# Patient Record
Sex: MALE | Race: WHITE | NOT HISPANIC OR LATINO | Employment: OTHER | URBAN - NONMETROPOLITAN AREA
[De-identification: names, ages, dates, MRNs, and addresses within clinical notes are randomized per-mention and may not be internally consistent; named-entity substitution may affect disease eponyms.]

---

## 2017-07-25 ENCOUNTER — OFFICE VISIT - GICH (OUTPATIENT)
Dept: FAMILY MEDICINE | Facility: OTHER | Age: 67
End: 2017-07-25

## 2017-07-25 ENCOUNTER — HISTORY (OUTPATIENT)
Dept: FAMILY MEDICINE | Facility: OTHER | Age: 67
End: 2017-07-25

## 2017-07-25 DIAGNOSIS — M25.551 PAIN IN RIGHT HIP: ICD-10-CM

## 2017-12-27 NOTE — PROGRESS NOTES
Patient Information     Patient Name MRN Sex DOB Geisler, Duane M [id number] Male 1950      Progress Notes by Lalo Penaloza MD at 2017  2:30 PM     Author:  Lalo Penaloza MD Service:  (none) Author Type:  Physician     Filed:  2017  2:48 PM Encounter Date:  2017 Status:  Signed     :  Lalo Penaloza MD (Physician)            SUBJECTIVE:  Duane M Geisler is a 66 y.o. male here for right hip pain. Patient was point softball last night when he got hit by a line drive in his right lateral hip. He is playing pitcher. He had immediate pain but was able to walk. Since then he has been using ibuprofen and ice. He has had improvement in his pain.    ROS:    As above otherwise ROS is unremarkable.    OBJECTIVE:  /76  Pulse 60  Ht 1.829 m (6')  Wt 91.1 kg (200 lb 12.8 oz)  BMI 27.23 kg/m2    EXAM:  General Appearance: Pleasant, alert, appropriate appearance for age. No acute distress  Musculoskeletal: Right hip shows flexion of 90 , internal rotation 10 , external rotation 30 . He has tenderness just below his greater trochanter.   Skin: He has a 15 cm x 12 cm bruise that is noted.      ASSESSEMENT AND PLAN:    Duane was seen today for leg pain/problem.    Diagnoses and all orders for this visit:    Right hip pain    Likely bruise. Recommend stretching and icing. If he has pain that is not improving we did discuss the possibility of calcification of his hematoma where ultrasound may be beneficial, he can contact me if that happens.      Chad Penaloza MD    This document was prepared using voice generated software.  While every attempt was made for accuracy, grammatical errors may exist.

## 2017-12-30 NOTE — NURSING NOTE
Patient Information     Patient Name MRN Sex DOB Geisler, Duane M [id number] Male 1950      Nursing Note by Nina Smith at 2017  2:30 PM     Author:  Nina Smith Service:  (none) Author Type:  (none)     Filed:  2017  2:48 PM Encounter Date:  2017 Status:  Signed     :  Nina Smith            Patient presents today with pain below his right hip from being struck by a softball. Patient states he felt increasing pain yesterday, but has improved today.  Nina Smith LPN .............2017  2:28 PM

## 2018-01-27 VITALS
DIASTOLIC BLOOD PRESSURE: 76 MMHG | HEIGHT: 72 IN | BODY MASS INDEX: 27.2 KG/M2 | WEIGHT: 200.8 LBS | HEART RATE: 60 BPM | SYSTOLIC BLOOD PRESSURE: 108 MMHG

## 2019-11-07 ENCOUNTER — OFFICE VISIT (OUTPATIENT)
Dept: PEDIATRICS | Facility: OTHER | Age: 69
End: 2019-11-07
Attending: INTERNAL MEDICINE
Payer: MEDICARE

## 2019-11-07 VITALS
SYSTOLIC BLOOD PRESSURE: 118 MMHG | DIASTOLIC BLOOD PRESSURE: 72 MMHG | TEMPERATURE: 97.1 F | OXYGEN SATURATION: 98 % | HEIGHT: 72 IN | RESPIRATION RATE: 18 BRPM | WEIGHT: 202 LBS | BODY MASS INDEX: 27.36 KG/M2 | HEART RATE: 68 BPM

## 2019-11-07 DIAGNOSIS — E78.2 MIXED HYPERLIPIDEMIA: ICD-10-CM

## 2019-11-07 DIAGNOSIS — Z12.5 SCREENING FOR PROSTATE CANCER: ICD-10-CM

## 2019-11-07 DIAGNOSIS — Z13.0 SCREENING, ANEMIA, DEFICIENCY, IRON: ICD-10-CM

## 2019-11-07 DIAGNOSIS — I49.9 IRREGULAR HEARTBEAT: ICD-10-CM

## 2019-11-07 DIAGNOSIS — Z11.59 ENCOUNTER FOR HEPATITIS C SCREENING TEST FOR LOW RISK PATIENT: ICD-10-CM

## 2019-11-07 DIAGNOSIS — Z13.1 SCREENING FOR DIABETES MELLITUS: ICD-10-CM

## 2019-11-07 DIAGNOSIS — Z13.29 SCREENING FOR THYROID DISORDER: ICD-10-CM

## 2019-11-07 DIAGNOSIS — Z23 NEED FOR VACCINATION: ICD-10-CM

## 2019-11-07 DIAGNOSIS — I48.3 TYPICAL ATRIAL FLUTTER (H): ICD-10-CM

## 2019-11-07 DIAGNOSIS — Z76.89 ENCOUNTER TO ESTABLISH CARE: Primary | ICD-10-CM

## 2019-11-07 LAB
ANION GAP SERPL CALCULATED.3IONS-SCNC: 6 MMOL/L (ref 3–14)
BUN SERPL-MCNC: 19 MG/DL (ref 7–25)
CALCIUM SERPL-MCNC: 9.4 MG/DL (ref 8.6–10.3)
CHLORIDE SERPL-SCNC: 103 MMOL/L (ref 98–107)
CHOLEST SERPL-MCNC: 228 MG/DL
CO2 SERPL-SCNC: 29 MMOL/L (ref 21–31)
CREAT SERPL-MCNC: 1.09 MG/DL (ref 0.7–1.3)
ERYTHROCYTE [DISTWIDTH] IN BLOOD BY AUTOMATED COUNT: 12.9 % (ref 10–15)
GFR SERPL CREATININE-BSD FRML MDRD: 67 ML/MIN/{1.73_M2}
GLUCOSE SERPL-MCNC: 100 MG/DL (ref 70–105)
HCT VFR BLD AUTO: 43.6 % (ref 40–53)
HDLC SERPL-MCNC: 59 MG/DL (ref 23–92)
HGB BLD-MCNC: 14.7 G/DL (ref 13.3–17.7)
LDLC SERPL CALC-MCNC: 146 MG/DL
MCH RBC QN AUTO: 30.6 PG (ref 26.5–33)
MCHC RBC AUTO-ENTMCNC: 33.7 G/DL (ref 31.5–36.5)
MCV RBC AUTO: 91 FL (ref 78–100)
NONHDLC SERPL-MCNC: 169 MG/DL
PLATELET # BLD AUTO: 288 10E9/L (ref 150–450)
POTASSIUM SERPL-SCNC: 4.2 MMOL/L (ref 3.5–5.1)
PSA SERPL-ACNC: 0.38 NG/ML
RBC # BLD AUTO: 4.8 10E12/L (ref 4.4–5.9)
SODIUM SERPL-SCNC: 138 MMOL/L (ref 134–144)
TRIGL SERPL-MCNC: 113 MG/DL
TSH SERPL DL<=0.05 MIU/L-ACNC: 2 IU/ML (ref 0.34–5.6)
WBC # BLD AUTO: 7.2 10E9/L (ref 4–11)

## 2019-11-07 PROCEDURE — 80048 BASIC METABOLIC PNL TOTAL CA: CPT | Mod: ZL | Performed by: INTERNAL MEDICINE

## 2019-11-07 PROCEDURE — G0472 HEP C SCREEN HIGH RISK/OTHER: HCPCS | Performed by: INTERNAL MEDICINE

## 2019-11-07 PROCEDURE — G0463 HOSPITAL OUTPT CLINIC VISIT: HCPCS | Mod: 25

## 2019-11-07 PROCEDURE — 99214 OFFICE O/P EST MOD 30 MIN: CPT | Performed by: INTERNAL MEDICINE

## 2019-11-07 PROCEDURE — G0009 ADMIN PNEUMOCOCCAL VACCINE: HCPCS

## 2019-11-07 PROCEDURE — 93005 ELECTROCARDIOGRAM TRACING: CPT | Performed by: INTERNAL MEDICINE

## 2019-11-07 PROCEDURE — G0103 PSA SCREENING: HCPCS | Mod: ZL | Performed by: INTERNAL MEDICINE

## 2019-11-07 PROCEDURE — 93010 ELECTROCARDIOGRAM REPORT: CPT | Performed by: INTERNAL MEDICINE

## 2019-11-07 PROCEDURE — 90662 IIV NO PRSV INCREASED AG IM: CPT

## 2019-11-07 PROCEDURE — 80061 LIPID PANEL: CPT | Mod: ZL | Performed by: INTERNAL MEDICINE

## 2019-11-07 PROCEDURE — 85027 COMPLETE CBC AUTOMATED: CPT | Mod: ZL | Performed by: INTERNAL MEDICINE

## 2019-11-07 PROCEDURE — 90670 PCV13 VACCINE IM: CPT

## 2019-11-07 PROCEDURE — 36415 COLL VENOUS BLD VENIPUNCTURE: CPT | Mod: ZL | Performed by: INTERNAL MEDICINE

## 2019-11-07 PROCEDURE — 86803 HEPATITIS C AB TEST: CPT | Mod: ZL | Performed by: INTERNAL MEDICINE

## 2019-11-07 PROCEDURE — G0008 ADMIN INFLUENZA VIRUS VAC: HCPCS

## 2019-11-07 PROCEDURE — 84443 ASSAY THYROID STIM HORMONE: CPT | Mod: ZL | Performed by: INTERNAL MEDICINE

## 2019-11-07 RX ORDER — ASPIRIN 81 MG/1
81 TABLET ORAL DAILY
COMMUNITY
Start: 2014-02-06 | End: 2019-11-07

## 2019-11-07 RX ORDER — METOPROLOL TARTRATE 25 MG/1
12.5 TABLET, FILM COATED ORAL 2 TIMES DAILY
Qty: 45 TABLET | Refills: 3 | Status: SHIPPED | OUTPATIENT
Start: 2019-11-07 | End: 2020-05-12

## 2019-11-07 RX ORDER — ASPIRIN 325 MG
325 TABLET, DELAYED RELEASE (ENTERIC COATED) ORAL DAILY
Qty: 90 TABLET | Refills: 3 | Status: SHIPPED | OUTPATIENT
Start: 2019-11-07 | End: 2020-11-23

## 2019-11-07 ASSESSMENT — MIFFLIN-ST. JEOR: SCORE: 1709.27

## 2019-11-07 ASSESSMENT — PAIN SCALES - GENERAL: PAINLEVEL: NO PAIN (0)

## 2019-11-07 NOTE — NURSING NOTE
Immunization Documentation    Prior to Immunization administration, verified patients identity using patient's name and date of birth. Please see IMMUNIZATIONS  and order for additional information.  Patient / Parent instructed to remain in clinic for 15 minutes and report any adverse reaction to staff immediately.    Was entire vial of medication used? Yes  Vial/Syringe: Single dose vial and syringe    Mireya Saucedo LPN  11/7/2019   11:32 AM

## 2019-11-07 NOTE — NURSING NOTE
Patient presents to clinic to establish care today and for a annual review.   Mireya Saucedo LPN ....................  11/7/2019   11:04 AM    No LMP for male patient.  Medication Reconciliation: complete    Mireya Saucedo LPN  11/7/2019 11:04 AM

## 2019-11-07 NOTE — PROGRESS NOTES
Subjective  Duane M Geisler is a 69 year old male who presents for [facility] primary care.  Previous physician was Dr. Agrawal.  He has no major health problems.  He is been taking an aspirin 81 mg a day and wonders if he should continue taking it.  No chest pains.  No heart palpitations.  No exercise intolerance.  No orthopnea.  No lower extremity edema.    Review of Systems:  Skin: Negative  Eyes: Negative  Ears/Nose/Throat: Negative  Respiratory: Negative  Cardiovascular: Negative  Gastrointestinal: Negative  Genitourinary: Negative  Musculoskeletal: Negative  Neurologic: Negative  Psychiatric: Negative  Hematologic/Lymphatic/Immunologic: Negative  Endocrine: Negative        Problem List/PMH: reviewed in EMR, and made relevant updates today.  Medications: reviewed in EMR, and made relevant updates today.  Allergies: reviewed in EMR, and made relevant updates today.    Social Hx:  Social History     Tobacco Use     Smoking status: Never Smoker     Smokeless tobacco: Never Used   Substance Use Topics     Alcohol use: Yes     Alcohol/week: 2.5 standard drinks     Comment: rare     Drug use: Never     Social History     Patient does not qualify to have social determinant information on file (likely too young).   Social History Narrative    , works for [employer] 4 children      Preloaded 3/13/2013.     I reviewed social history and made relevant updates today.    Family Hx:   Family History   Problem Relation Age of Onset     Heart Disease Mother         Heart Disease,h/o valve replacement     Blood Disease Mother         Blood Disease,myelodysplasia     Diabetes Father         Diabetes     Cancer Maternal Grandmother         Cancer, lung cancer     Microcephaly Maternal Grandfather      Myocardial Infarction Maternal Grandfather        Objective  Vitals: reviewed in EMR.  /72 (BP Location: Right arm, Patient Position: Sitting, Cuff Size: Adult Large)   Pulse 68   Temp 97.1  F (36.2   C) (Tympanic)   Resp 18   Ht 1.829 m (6')   Wt 91.6 kg (202 lb)   SpO2 98%   BMI 27.40 kg/m      Gen: Pleasant male, NAD.  HEENT: MMM, no OP erythema.   Neck: Supple, no JVD, no bruits.  CV: Irregularly irregular, no murmurs  Pulm: CTAB no w/r/r  Neuro: Grossly intact  Msk: No lower extremity edema.  Skin: No concerning lesions.  Psychiatric: Normal affect and insight. Does not appear anxious or depressed.    Component      Latest Ref Rng & Units 4/5/2016   Protein Total      6.4 - 8.9 g/dL 6.8   AST (SGOT) - Historical      13 - 39 IU/L 18   Sodium      134 - 143 mmol/L 140   Potassium      3.5 - 5.1 mmol/L 3.9   Calcium      8.6 - 10.3 mg/dL 9.6   Urea Nitrogen      7 - 25 mg/dL 20   Chloride      98 - 107 mmol/L 109 (H)   GFR Estimate If Black      >60 ml/min/1.73m2 >60   BUN/Creatinine Ratio - Historical       18   Albumin      3.5 - 5.7 g/dL 3.8   A/G Ratio - Historical      1.0 - 2.0 1.3   Alkaline Phosphatase      34 - 104 IU/L 64   Anion Gap - Historical      5 - 18 2 (L)   Globulin - Historical      2.0 - 3.7 g/dL 3.0   Bilirubin Total      0.3 - 1.0 mg/dL 0.4   Glucose      70 - 105 mg/dL 100   ALT (SGPT) - Historical      7 - 52 IU/L 17   Carbon Dioxide      21 - 31 mmol/L 29   Creatinine      0.70 - 1.30 mg/dL 1.14   GFR If Not  - Historical      >60 ml/min/1.73m2 >60     Result Information     Status: Final result (Resulted: 11/7/2019) Provider Status: Open   Narrative     EKG Interpretation:   Rhythm: Flutter  Rate: 90  Axis: Normal  Conduction: Normal  QRS: Normal  ST Segments: Normal  T-wave: Normal  Chambers: Normal  PACs Present: No  PVCs Present: No    Impression:   Abnormal EKG.  Atrial flutter.  Compared to February 6, 2014: Atrial flutter is new.    Signed, Bryant Weeks MD, FAAP, FACP  Internal Medicine & Pediatrics  11/7/2019  4:41 PM         Assessment    ICD-10-CM    1. Encounter to establish care Z76.89    2. Mixed hyperlipidemia E78.2 Lipid Profile     Lipid Profile    3. Encounter for hepatitis C screening test for low risk patient Z11.59 Hepatitis C Screen Reflex to HCV RNA Quant and Genotype     Hepatitis C Screen Reflex to HCV RNA Quant and Genotype   4. Need for vaccination Z23    5. Screening for diabetes mellitus Z13.1 Basic metabolic panel     Basic metabolic panel   6. Screening, anemia, deficiency, iron Z13.0 CBC with platelets     CBC with platelets   7. Screening for prostate cancer Z12.5 PSA Screen GH     PSA Screen GH   8. Screening for thyroid disorder Z13.29 Thyrotropin GH     Thyrotropin GH   9. Irregular heartbeat I49.9 EKG 12-lead, tracing only   10. Typical atrial flutter (H) I48.3 aspirin (ASA) 325 MG EC tablet     metoprolol tartrate (LOPRESSOR) 25 MG tablet     Orders Placed This Encounter   Procedures     GH IMM-  PNEUMOCOCCAL CONJ VACCINE 13 VALENT IM     GH IMM-  FLU VACCINE, INCREASED ANTIGEN, PRESV FREE     Hepatitis C Screen Reflex to HCV RNA Quant and Genotype     Basic metabolic panel     CBC with platelets     Lipid Profile     PSA Screen GH     Thyrotropin GH     EKG 12-lead, tracing only       After discovering the irregular heart rate today an EKG was obtained revealing atrial flutter.  We had a lengthy discussion today with regards to atrial fibrillation/flutter today including pathophysiology, expected clinical course, possible complications.  Given his CHADS2-VASc score of 1 (age) a full-strength aspirin is recommended.  With regards to rate versus rhythm control he is asymptomatic and we decided on rate control and low-dose metoprolol was initiated.  Warning signs discussed including fatigue and hypotension.    Plan   -- Expected clinical course discussed   -- Medications and their side effects discussed  Patient Instructions      -- Flu and Prevnar 13 vaccines today   -- Tdap and shingles vaccines at the pharmacy     -- Start aspirin 325 mg daily for stroke prevention in atrial fib with CHADS2-VASc of 1   -- Start metoprolol at a low dose  to keep your resting heart rate under 90 if possible    Patient Education     What Is Atrial Flutter/Atrial Fibrillation?      The heart has its own electrical system. This system makes the signals that start each heartbeat. The heartbeat begins in 1 of the 2 upper chambers of the heart (atria). A problem can make the atria beat faster than normal. The atria may beat fast but still evenly. This problem is called atrial flutter. If the atria beat very fast and also unevenly, it is called atrial fibrillation (AFib).  Causes of Atrial Flutter and Atrial Fibrillation  Causes of these problems can include:    Previous heart attack    High blood pressure    Thyroid problems  In many cases, the cause is unknown.  When the Atria Beat Too Fast  The atria may beat fast only once in a while. This is called a paroxysmal heart rhythm problem. If they beat fast all the time, it is a chronic problem.  Atrial Flutter  With atrial flutter, electrical signals travel around and around inside the atria. These circling signals make the atria beat too fast:    Atrial flutter can cause symptoms similar to AFib. It can also lead to the even faster, uneven rhythms of AFib.  Atrial Fibrillation (AFib)  With AFib, cells in the atria send extra electrical signals. These extra signals make the atria beat very fast. They also beat unevenly:    The atria beat so fast and unevenly that they may quiver instead of william. If the atria don t contract, they don t move enough blood into the 2 lower chambers of the heart (ventricles). This can cause you to feel dizzy or weak.    Blood that doesn t keep moving can pool and form clots in the atria. These clots can move into other parts of the body and cause serious problems such as a stroke.   Symptoms of Atrial Flutter and AFib  These symptoms include the following:    Palpitations (a fluttering, fast heartbeat)    Weakness or tiredness    Shortness of breath    Chest pain or tightness    Dizziness  or lightheadedness    Fainting spells   Date Last Reviewed: 2/26/2014 2000-2018 [employer]. [address] [facility], [place] [address]. All rights reserved. This information is not intended as a substitute for professional medical care. Always follow your healthcare professional's instructions.               Return in about 1 year (around 11/7/2020), or if symptoms worsen or fail to improve, for medication management.    Signed, Bryant Weeks MD, FAAP, FACP  Internal Medicine & Pediatrics

## 2019-11-07 NOTE — LETTER
November 11, 2019      Duane M Geisler  [address]  [place] [address]        Dear ,    We are writing to inform you of your test results.    Your cholesterol score is elevated at 16.8%.  Above 7.5% is recommended to start a statin.  This would be in order to reduce her risk for heart attack and stroke.  Let me know if you are willing to start one, for example atorvastatin 10 mg daily.    Signed, Bryant Weeks MD, FAAP, FACP  Internal Medicine & Pediatrics      The 10-year ASCVD risk score (Alexeyheron HARLEY Jr., et al., 2013) is: 16.8%    Values used to calculate the score:      Age: 69 years      Sex: Male      Is Non- : No      Diabetic: No      Tobacco smoker: No      Systolic Blood Pressure: 118 mmHg      Is BP treated: No      HDL Cholesterol: 59 mg/dL      Total Cholesterol: 228 mg/dL      Resulted Orders   Thyrotropin GH   Result Value Ref Range    Thyrotropin 2.00 0.34 - 5.60 IU/mL   PSA Screen GH   Result Value Ref Range    PSA Screen 0.383 <3.100 ng/mL      Comment:      The DXI Access PSAS WHO assay is a two site immunoenzymatic assay. Assay   values obtained with different assay methods cannot be used interchangeably   due to differences in assay methods and reagent specificity.     Lipid Profile   Result Value Ref Range    Cholesterol 228 (H) <200 mg/dL    Triglycerides 113 <150 mg/dL    HDL Cholesterol 59 23 - 92 mg/dL    LDL Cholesterol Calculated 146 (H) <100 mg/dL      Comment:      Above desirable:  100-129 mg/dl  Borderline High:  130-159 mg/dL  High:             160-189 mg/dL  Very high:       >189 mg/dl      Non HDL Cholesterol 169 (H) <130 mg/dL      Comment:      Above Desirable:  130-159 mg/dl  Borderline high:  160-189 mg/dl  High:             190-219 mg/dl  Very high:       >219 mg/dl     CBC with platelets   Result Value Ref Range    WBC 7.2 4.0 - 11.0 10e9/L    RBC Count 4.80 4.4 - 5.9 10e12/L    Hemoglobin 14.7 13.3 - 17.7 g/dL    Hematocrit 43.6 40.0 -  53.0 %    MCV 91 78 - 100 fl    MCH 30.6 26.5 - 33.0 pg    MCHC 33.7 31.5 - 36.5 g/dL    RDW 12.9 10.0 - 15.0 %    Platelet Count 288 150 - 450 10e9/L   Basic metabolic panel   Result Value Ref Range    Sodium 138 134 - 144 mmol/L    Potassium 4.2 3.5 - 5.1 mmol/L    Chloride 103 98 - 107 mmol/L    Carbon Dioxide 29 21 - 31 mmol/L    Anion Gap 6 3 - 14 mmol/L    Glucose 100 70 - 105 mg/dL    Urea Nitrogen 19 7 - 25 mg/dL    Creatinine 1.09 0.70 - 1.30 mg/dL    GFR Estimate 67 >60 mL/min/[1.73_m2]    GFR Estimate If Black 81 >60 mL/min/[1.73_m2]    Calcium 9.4 8.6 - 10.3 mg/dL   Hepatitis C Screen Reflex to HCV RNA Quant and Genotype   Result Value Ref Range    Hepatitis C Antibody Nonreactive NR^Nonreactive      Comment:      Assay performance characteristics have not been established for newborns,   infants, and children         If you have any questions or concerns, please call the clinic at the number listed above.       Sincerely,        Bryant Weeks MD

## 2019-11-07 NOTE — PATIENT INSTRUCTIONS
-- Flu and Prevnar 13 vaccines today   -- Tdap and shingles vaccines at the pharmacy     -- Start aspirin 325 mg daily for stroke prevention in atrial fib with CHADS2-VASc of 1   -- Start metoprolol at a low dose to keep your resting heart rate under 90 if possible    Patient Education     What Is Atrial Flutter/Atrial Fibrillation?      The heart has its own electrical system. This system makes the signals that start each heartbeat. The heartbeat begins in 1 of the 2 upper chambers of the heart (atria). A problem can make the atria beat faster than normal. The atria may beat fast but still evenly. This problem is called atrial flutter. If the atria beat very fast and also unevenly, it is called atrial fibrillation (AFib).  Causes of Atrial Flutter and Atrial Fibrillation  Causes of these problems can include:    Previous heart attack    High blood pressure    Thyroid problems  In many cases, the cause is unknown.  When the Atria Beat Too Fast  The atria may beat fast only once in a while. This is called a paroxysmal heart rhythm problem. If they beat fast all the time, it is a chronic problem.  Atrial Flutter  With atrial flutter, electrical signals travel around and around inside the atria. These circling signals make the atria beat too fast:    Atrial flutter can cause symptoms similar to AFib. It can also lead to the even faster, uneven rhythms of AFib.  Atrial Fibrillation (AFib)  With AFib, cells in the atria send extra electrical signals. These extra signals make the atria beat very fast. They also beat unevenly:    The atria beat so fast and unevenly that they may quiver instead of william. If the atria don t contract, they don t move enough blood into the 2 lower chambers of the heart (ventricles). This can cause you to feel dizzy or weak.    Blood that doesn t keep moving can pool and form clots in the atria. These clots can move into other parts of the body and cause serious problems such as a  stroke.   Symptoms of Atrial Flutter and AFib  These symptoms include the following:    Palpitations (a fluttering, fast heartbeat)    Weakness or tiredness    Shortness of breath    Chest pain or tightness    Dizziness or lightheadedness    Fainting spells   Date Last Reviewed: 2/26/2014 2000-2018 [employer]. [address] [place] [address]. All rights reserved. This information is not intended as a substitute for professional medical care. Always follow your healthcare professional's instructions.

## 2019-11-10 LAB — HCV AB SERPL QL IA: NONREACTIVE

## 2020-05-12 DIAGNOSIS — I48.3 TYPICAL ATRIAL FLUTTER (H): ICD-10-CM

## 2020-05-12 RX ORDER — METOPROLOL TARTRATE 25 MG/1
TABLET, FILM COATED ORAL
Qty: 45 TABLET | Refills: 3 | Status: SHIPPED | OUTPATIENT
Start: 2020-05-12 | End: 2020-11-18

## 2020-05-12 NOTE — TELEPHONE ENCOUNTER
[facility] sent Rx request for the following:      Metoprolol Tartrate 25 MG Oral Tablet   Sig: Take 1/2 (one-half) tablet by mouth twice daily      Last Prescription Date:   11/7/2019  Last Fill Qty/Refills:         45, R-3    Last Office Visit:              11/7/2019   Future Office visit:           none      Prescription refilled per RN Medication Refill Policy.................... Dequan Mccormick RN ....................  5/12/2020   11:18 AM

## 2020-11-13 DIAGNOSIS — I48.3 TYPICAL ATRIAL FLUTTER (H): ICD-10-CM

## 2020-11-16 RX ORDER — METOPROLOL TARTRATE 25 MG/1
TABLET, FILM COATED ORAL
Qty: 45 TABLET | Refills: 0 | OUTPATIENT
Start: 2020-11-16

## 2020-11-16 NOTE — TELEPHONE ENCOUNTER
metoprolol tartrate (LOPRESSOR) 25 MG tablet 45 tablet 3 5/12/2020  No   Sig: Take 1/2 (one-half) tablet by mouth twice daily     To Estephania Gilbert RN on 11/16/2020 at 10:42 AM

## 2020-11-18 ENCOUNTER — TELEPHONE (OUTPATIENT)
Dept: PEDIATRICS | Facility: OTHER | Age: 70
End: 2020-11-18

## 2020-11-18 DIAGNOSIS — I48.3 TYPICAL ATRIAL FLUTTER (H): ICD-10-CM

## 2020-11-18 RX ORDER — METOPROLOL TARTRATE 25 MG/1
TABLET, FILM COATED ORAL
Qty: 90 TABLET | Refills: 3 | Status: SHIPPED | OUTPATIENT
Start: 2020-11-18 | End: 2021-11-19

## 2020-11-18 NOTE — TELEPHONE ENCOUNTER
Called pharmacy and spoke with Yvonne , pharmacist and she states that Metoprolol was denied because patient should have refills.  Per current med list  metoprolol tartrate (LOPRESSOR) 25 MG tablet 45 tablet 3 5/12/2020  No   Sig: Take 1/2 (one-half) tablet by mouth twice daily     Which is only a 6 month supply because patient is taking 1/2 tablet twice daily.    Will route to Dr. Weeks for review and consideration  Patient is noted to have appointment on 11/23/2020 but patient will be out before appt    Julia Gilbert RN on 11/18/2020 at 9:44 AM

## 2020-11-23 ENCOUNTER — OFFICE VISIT (OUTPATIENT)
Dept: PEDIATRICS | Facility: OTHER | Age: 70
End: 2020-11-23
Attending: INTERNAL MEDICINE
Payer: MEDICARE

## 2020-11-23 VITALS
OXYGEN SATURATION: 99 % | SYSTOLIC BLOOD PRESSURE: 126 MMHG | HEART RATE: 82 BPM | TEMPERATURE: 96.1 F | DIASTOLIC BLOOD PRESSURE: 92 MMHG | WEIGHT: 205.4 LBS | BODY MASS INDEX: 27.82 KG/M2 | HEIGHT: 72 IN | RESPIRATION RATE: 16 BRPM

## 2020-11-23 DIAGNOSIS — Z13.220 LIPID SCREENING: ICD-10-CM

## 2020-11-23 DIAGNOSIS — I48.3 TYPICAL ATRIAL FLUTTER (H): Primary | ICD-10-CM

## 2020-11-23 DIAGNOSIS — Z91.89 10 YEAR RISK OF MI OR STROKE 7.5% OR GREATER: ICD-10-CM

## 2020-11-23 DIAGNOSIS — R04.0 EPISTAXIS: ICD-10-CM

## 2020-11-23 DIAGNOSIS — Z13.1 SCREENING FOR DIABETES MELLITUS: ICD-10-CM

## 2020-11-23 DIAGNOSIS — E78.2 MIXED HYPERLIPIDEMIA: ICD-10-CM

## 2020-11-23 LAB
ANION GAP SERPL CALCULATED.3IONS-SCNC: 5 MMOL/L (ref 3–14)
BUN SERPL-MCNC: 16 MG/DL (ref 7–25)
CALCIUM SERPL-MCNC: 9.5 MG/DL (ref 8.6–10.3)
CHLORIDE SERPL-SCNC: 106 MMOL/L (ref 98–107)
CHOLEST SERPL-MCNC: 211 MG/DL
CO2 SERPL-SCNC: 29 MMOL/L (ref 21–31)
CREAT SERPL-MCNC: 1.09 MG/DL (ref 0.7–1.3)
ERYTHROCYTE [DISTWIDTH] IN BLOOD BY AUTOMATED COUNT: 13 % (ref 10–15)
GFR SERPL CREATININE-BSD FRML MDRD: 67 ML/MIN/{1.73_M2}
GLUCOSE SERPL-MCNC: 95 MG/DL (ref 70–105)
HCT VFR BLD AUTO: 42.5 % (ref 40–53)
HDLC SERPL-MCNC: 55 MG/DL (ref 23–92)
HGB BLD-MCNC: 14.2 G/DL (ref 13.3–17.7)
LDLC SERPL CALC-MCNC: 127 MG/DL
MCH RBC QN AUTO: 30.7 PG (ref 26.5–33)
MCHC RBC AUTO-ENTMCNC: 33.4 G/DL (ref 31.5–36.5)
MCV RBC AUTO: 92 FL (ref 78–100)
NONHDLC SERPL-MCNC: 156 MG/DL
PLATELET # BLD AUTO: 296 10E9/L (ref 150–450)
POTASSIUM SERPL-SCNC: 3.9 MMOL/L (ref 3.5–5.1)
RBC # BLD AUTO: 4.62 10E12/L (ref 4.4–5.9)
SODIUM SERPL-SCNC: 140 MMOL/L (ref 134–144)
TRIGL SERPL-MCNC: 147 MG/DL
WBC # BLD AUTO: 7.6 10E9/L (ref 4–11)

## 2020-11-23 PROCEDURE — 80061 LIPID PANEL: CPT | Mod: ZL | Performed by: INTERNAL MEDICINE

## 2020-11-23 PROCEDURE — G0463 HOSPITAL OUTPT CLINIC VISIT: HCPCS | Mod: 25

## 2020-11-23 PROCEDURE — 80048 BASIC METABOLIC PNL TOTAL CA: CPT | Mod: ZL | Performed by: INTERNAL MEDICINE

## 2020-11-23 PROCEDURE — 85027 COMPLETE CBC AUTOMATED: CPT | Mod: ZL | Performed by: INTERNAL MEDICINE

## 2020-11-23 PROCEDURE — 99214 OFFICE O/P EST MOD 30 MIN: CPT | Performed by: INTERNAL MEDICINE

## 2020-11-23 PROCEDURE — 36415 COLL VENOUS BLD VENIPUNCTURE: CPT | Mod: ZL | Performed by: INTERNAL MEDICINE

## 2020-11-23 PROCEDURE — G0463 HOSPITAL OUTPT CLINIC VISIT: HCPCS

## 2020-11-23 PROCEDURE — 90732 PPSV23 VACC 2 YRS+ SUBQ/IM: CPT

## 2020-11-23 RX ORDER — ATORVASTATIN CALCIUM 10 MG/1
10 TABLET, FILM COATED ORAL DAILY
Qty: 90 TABLET | Refills: 3 | Status: SHIPPED | OUTPATIENT
Start: 2020-11-23 | End: 2022-01-04

## 2020-11-23 RX ORDER — ASPIRIN 81 MG/1
81 TABLET, CHEWABLE ORAL DAILY
COMMUNITY
Start: 2020-11-23 | End: 2024-02-15

## 2020-11-23 ASSESSMENT — MIFFLIN-ST. JEOR: SCORE: 1719.69

## 2020-11-23 ASSESSMENT — PAIN SCALES - GENERAL: PAINLEVEL: NO PAIN (0)

## 2020-11-23 NOTE — NURSING NOTE
Chief Complaint   Patient presents with     Recheck Medication     Patient is here for a recheck on medications    Initial BP (!) 140/94 (BP Location: Right arm, Patient Position: Sitting, Cuff Size: Adult Regular)   Pulse 82   Temp 96.1  F (35.6  C) (Tympanic)   Resp 16   Ht 1.829 m (6')   Wt 93.2 kg (205 lb 6.4 oz)   SpO2 99%   BMI 27.86 kg/m   Estimated body mass index is 27.86 kg/m  as calculated from the following:    Height as of this encounter: 1.829 m (6').    Weight as of this encounter: 93.2 kg (205 lb 6.4 oz).  Medication Reconciliation: complete    Josselyn Montilla MA     Immunization Documentation    Prior to Immunization administration, verified patients identity using patient's name and date of birth. Please see IMMUNIZATIONS  and order for additional information.  Patient / Parent instructed to remain in clinic for 15 minutes and report any adverse reaction to staff immediately.    Was the entire amount of vaccines given used? Yes    Josselyn Montilla MA  11/23/2020   10:59 AM

## 2020-11-23 NOTE — PATIENT INSTRUCTIONS
-- Continue aspirin 81 mg   -- Humidifier in bedroom   -- Consider ENT consult if nosebleeds     -- Research and consider starting atorvastatin     -- Avoid constipation   -- Low salt   -- Low caffeine/alcohol   -- No fluids after supper     -- Get Tdap at pharmacy   -- Get the new shingles vaccine series from a nurse-only visit, pharmacy or [facility] (Counts include 234 beds at the [facility] RN).   -- Pneumonia 23 today    The 10-year ASCVD risk score (Alexey HARLEY Jr., et al., 2013) is: 19.9%    Values used to calculate the score:      Age: 70 years      Sex: Male      Is Non- : No      Diabetic: No      Tobacco smoker: No      Systolic Blood Pressure: 126 mmHg      Is BP treated: No      HDL Cholesterol: 59 mg/dL      Total Cholesterol: 228 mg/dL

## 2020-11-23 NOTE — PROGRESS NOTES
"Subjective  Duane M Geisler is a 70 year old male who presents for medication management.  He wants to know why he is on \"that little white pill.\"  He has a history of atrial flutter and continues on metoprolol for rate control.  He had increased his aspirin from 81 up to 325 however he developed nosebleed so he reduced it back to 81 and has had no recurrence of nosebleeds.  He did not start the statin as we recommended last year.  He is apprehensive about starting new medications.  No chest pains with exertion.  Rare heart palpitations.  He has been going to the bathroom at night 1-4 times which is stable for years.  He gets a slight swelling on his ankle in the evening better by morning.    Problem List/PMH: reviewed in EMR, and made relevant updates today.  Medications: reviewed in EMR, and made relevant updates today.  Allergies: reviewed in EMR, and made relevant updates today.    Social Hx:  Social History     Tobacco Use     Smoking status: Never Smoker     Smokeless tobacco: Never Used   Substance Use Topics     Alcohol use: Yes     Alcohol/week: 2.5 standard drinks     Comment: rare     Drug use: Never     Social History     Social History Narrative    , works for [employer]      I reviewed social history and made relevant updates today.    Family Hx:   Family History   Problem Relation Age of Onset     Heart Disease Mother         Heart Disease,h/o valve replacement     Blood Disease Mother         Blood Disease,myelodysplasia     Diabetes Father         Diabetes     Cancer Maternal Grandmother         Cancer, lung cancer     Microcephaly Maternal Grandfather      Myocardial Infarction Maternal Grandfather        Objective  Vitals: reviewed in EMR.  BP (!) 126/92 (BP Location: Right arm, Patient Position: Sitting, Cuff Size: Adult Regular)   Pulse 82   Temp 96.1  F (35.6  C) (Tympanic)   Resp 16   Ht 1.829 m (6')   Wt 93.2 kg (205 lb 6.4 oz)   SpO2 99%   BMI " 27.86 kg/m      Gen: Pleasant male, NAD.  HEENT: MMM, no OP erythema.   Neck: Supple  CV: Irregularly irregular no m/r/g.   Pulm: CTAB no w/r/r  GI: Soft, NT, ND. No HSM. No masses. Bowel sounds present.  Neuro: Grossly intact  Msk: No lower extremity edema.  Skin: No concerning lesions.  Psychiatric: Normal affect and insight. Does not appear anxious or depressed.    Labs:  Lab Results   Component Value Date    WBC 7.6 11/23/2020    HGB 14.2 11/23/2020    HCT 42.5 11/23/2020     11/23/2020    CHOL 211 (H) 11/23/2020    TRIG 147 11/23/2020    HDL 55 11/23/2020     11/23/2020    BUN 16 11/23/2020    CO2 29 11/23/2020       Glucose   Date Value Ref Range Status   11/23/2020 95 70 - 105 mg/dL Final   11/07/2019 100 70 - 105 mg/dL Final     No results found for: A1C  Creatinine   Date Value Ref Range Status   11/23/2020 1.09 0.70 - 1.30 mg/dL Final   11/07/2019 1.09 0.70 - 1.30 mg/dL Final     LDL Cholesterol Calculated   Date Value Ref Range Status   11/23/2020 127 (H) <100 mg/dL Final     Comment:     Above desirable:  100-129 mg/dl  Borderline High:  130-159 mg/dL  High:             160-189 mg/dL  Very high:       >189 mg/dl       Thyrotropin   Date Value Ref Range Status   11/07/2019 2.00 0.34 - 5.60 IU/mL Final               Assessment    ICD-10-CM    1. Typical atrial flutter (H)  I48.3 aspirin (ASA) 81 MG chewable tablet   2. Lipid screening  Z13.220 Lipid Profile     Lipid Profile   3. Screening for diabetes mellitus  Z13.1 Basic metabolic panel     Basic metabolic panel   4. Mixed hyperlipidemia  E78.2 atorvastatin (LIPITOR) 10 MG tablet   5. 10 year risk of MI or stroke 7.5% or greater  Z91.89 atorvastatin (LIPITOR) 10 MG tablet   6. Epistaxis  R04.0 CBC with platelets     CBC with platelets     Orders Placed This Encounter   Procedures     Pneumococcal vaccine 23 valent PPSV23  (Pneumovax) [03560]     Lipid Profile     Basic metabolic panel     CBC with platelets       With regards to atrial  fibrillation/flutter his [id number] score is 1 for age and anticoagulation is not indicated at this time.  Based on his episodes of epistaxis I agree with ongoing aspirin 81 mg.  We discussed the possibility of ENT consultation and decided against it at this time.    Plan   -- Expected clinical course discussed   -- Medications and their side effects discussed  Patient Instructions    -- Continue aspirin 81 mg   -- Humidifier in bedroom   -- Consider ENT consult if nosebleeds     -- Research and consider starting atorvastatin     -- Avoid constipation   -- Low salt   -- Low caffeine/alcohol   -- No fluids after supper     -- Get Tdap at pharmacy   -- Get the new shingles vaccine series from a nurse-only visit, pharmacy or [facility] ([facility] RN).   -- Pneumonia 23 today    The 10-year ASCVD risk score (Idalou CHERRI Jr., et al., 2013) is: 19.9%    Values used to calculate the score:      Age: 70 years      Sex: Male      Is Non- : No      Diabetic: No      Tobacco smoker: No      Systolic Blood Pressure: 126 mmHg      Is BP treated: No      HDL Cholesterol: 59 mg/dL      Total Cholesterol: 228 mg/dL        Return in about 1 year (around 11/23/2021), or if symptoms worsen or fail to improve.    Signed, Bryant Weeks MD, FAAP, FACP  Internal Medicine & Pediatrics

## 2020-11-23 NOTE — LETTER
November 23, 2020      Duane M Geisler  [address]  [place] [address]        Dear ,    We are writing to inform you of your test results.    Labs look fine. No change to our plan.    Signed, Bryant Weeks MD, FAAP, FACP  Internal Medicine & Pediatrics      The 10-year ASCVD risk score (Alexey HARLEY Jr., et al., 2013) is: 19.8%    Values used to calculate the score:      Age: 70 years      Sex: Male      Is Non- : No      Diabetic: No      Tobacco smoker: No      Systolic Blood Pressure: 126 mmHg      Is BP treated: No      HDL Cholesterol: 55 mg/dL      Total Cholesterol: 211 mg/dL      Resulted Orders   CBC with platelets   Result Value Ref Range    WBC 7.6 4.0 - 11.0 10e9/L    RBC Count 4.62 4.4 - 5.9 10e12/L    Hemoglobin 14.2 13.3 - 17.7 g/dL    Hematocrit 42.5 40.0 - 53.0 %    MCV 92 78 - 100 fl    MCH 30.7 26.5 - 33.0 pg    MCHC 33.4 31.5 - 36.5 g/dL    RDW 13.0 10.0 - 15.0 %    Platelet Count 296 150 - 450 10e9/L   Basic metabolic panel   Result Value Ref Range    Sodium 140 134 - 144 mmol/L    Potassium 3.9 3.5 - 5.1 mmol/L    Chloride 106 98 - 107 mmol/L    Carbon Dioxide 29 21 - 31 mmol/L    Anion Gap 5 3 - 14 mmol/L    Glucose 95 70 - 105 mg/dL    Urea Nitrogen 16 7 - 25 mg/dL    Creatinine 1.09 0.70 - 1.30 mg/dL    GFR Estimate 67 >60 mL/min/[1.73_m2]    GFR Estimate If Black 80 >60 mL/min/[1.73_m2]    Calcium 9.5 8.6 - 10.3 mg/dL   Lipid Profile   Result Value Ref Range    Cholesterol 211 (H) <200 mg/dL    Triglycerides 147 <150 mg/dL    HDL Cholesterol 55 23 - 92 mg/dL    LDL Cholesterol Calculated 127 (H) <100 mg/dL      Comment:      Above desirable:  100-129 mg/dl  Borderline High:  130-159 mg/dL  High:             160-189 mg/dL  Very high:       >189 mg/dl      Non HDL Cholesterol 156 (H) <130 mg/dL      Comment:      Above Desirable:  130-159 mg/dl  Borderline high:  160-189 mg/dl  High:             190-219 mg/dl  Very high:       >219 mg/dl         If you  have any questions or concerns, please call the clinic at the number listed above.       Sincerely,        Bryant Weeks MD

## 2020-12-14 ENCOUNTER — HOSPITAL ENCOUNTER (OUTPATIENT)
Dept: GENERAL RADIOLOGY | Facility: OTHER | Age: 70
End: 2020-12-14
Attending: INTERNAL MEDICINE
Payer: MEDICARE

## 2020-12-14 ENCOUNTER — OFFICE VISIT (OUTPATIENT)
Dept: PEDIATRICS | Facility: OTHER | Age: 70
End: 2020-12-14
Attending: INTERNAL MEDICINE
Payer: MEDICARE

## 2020-12-14 VITALS
BODY MASS INDEX: 28.2 KG/M2 | HEART RATE: 90 BPM | RESPIRATION RATE: 16 BRPM | TEMPERATURE: 96 F | DIASTOLIC BLOOD PRESSURE: 98 MMHG | SYSTOLIC BLOOD PRESSURE: 134 MMHG | WEIGHT: 207.9 LBS | OXYGEN SATURATION: 91 %

## 2020-12-14 DIAGNOSIS — I48.91 ATRIAL FIBRILLATION/FLUTTER (H): ICD-10-CM

## 2020-12-14 DIAGNOSIS — I48.92 ATRIAL FIBRILLATION/FLUTTER (H): ICD-10-CM
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
## 2020-12-14 DIAGNOSIS — S22.31XA CLOSED FRACTURE OF ONE RIB OF RIGHT SIDE, INITIAL ENCOUNTER: Primary | ICD-10-CM

## 2020-12-14 PROBLEM — I48.3 TYPICAL ATRIAL FLUTTER (H): Status: RESOLVED | Noted: 2019-11-07 | Resolved: 2020-12-14

## 2020-12-14 PROCEDURE — 99213 OFFICE O/P EST LOW 20 MIN: CPT | Performed by: INTERNAL MEDICINE

## 2020-12-14 PROCEDURE — 71101 X-RAY EXAM UNILAT RIBS/CHEST: CPT | Mod: RT

## 2020-12-14 PROCEDURE — G0463 HOSPITAL OUTPT CLINIC VISIT: HCPCS

## 2020-12-14 PROCEDURE — G0463 HOSPITAL OUTPT CLINIC VISIT: HCPCS | Mod: 25

## 2020-12-14 RX ORDER — HYDROCODONE BITARTRATE AND ACETAMINOPHEN 5; 325 MG/1; MG/1
1 TABLET ORAL
Qty: 7 TABLET | Refills: 0 | Status: SHIPPED | OUTPATIENT
Start: 2020-12-14 | End: 2020-12-21

## 2020-12-14 ASSESSMENT — PAIN SCALES - GENERAL: PAINLEVEL: EXTREME PAIN (9)

## 2020-12-14 NOTE — PATIENT INSTRUCTIONS
-- Voltaren gel   -- Norco at bedtime if needed   -- Ibuprofen throughout the day if needed   -- Rest   -- Deep breathing   -- Return if worse    Patient Education     Rib Fracture    You broke one or more ribs. This is called a rib fracture. Rib fractures don't need a cast like other bones. They will heal by themselves in about 4 to 6 weeks. The first 3 to 4 weeks will be the most painful. During this time deep breathing, coughing, or changing position from sitting to lying down, may cause the broken ends to move slightly.   Home care    Rest. You should not be doing any heavy lifting or strenuous exertion until the pain goes away.    It hurts to breathe when you have a broken rib. This puts you at risk of getting pneumonia from poor airflow through your lungs. To prevent this:  ? Take several very deep breaths once an hour while you're awake. Breathe out through pursed lips as if you are blowing up a balloon. If possible, actually blow up a balloon or a rubber glove. This exercise builds up pressure inside the lung and prevents collapse of the small air sacs of the lung. This exercise may cause some pain at the site of injury. This is normal.  ? You may have gotten a breathing exercise device called an incentive spirometer. Use it at least 4 times a day, or as directed.    Apply an ice pack over the injured area for 15 to 20 minutes every 1 to 2 hours. You should do this for the first 24 to 48 hours. To make an ice pack, put ice cubes in a plastic bag that seals at the top. Wrap the bag in a clean, thin towel or cloth. Never put ice or an ice pack directly on the skin. Keep using ice packs as needed for the relief of pain and swelling.    You may use over-the-counter pain medicine to control pain, unless another pain medicine was prescribed. If you have chronic liver or kidney disease or ever had a stomach ulcer or GI (gastrointestinal) bleeding, talk with your healthcare provider before using these  medicines.    If your pain is not controlled, contact your healthcare provider. Sometimes a stronger pain medicine may be needed. A nerve block can be done in case of severe pain. It will numb the nerve between the ribs.    Follow-up care  Follow up with your healthcare provider, or as advised. In rare cases, a broken rib will cause complications in the first few days that may not be clearly seen during your initial exam. This can include collapsed lung, bleeding around the lung or into the belly (abdomen), or pneumonia. So watch for the signs below.   If X-rays were taken, you will be told of any new findings that may affect your care.  Call 911  Call 911 if you have:     Dizziness, weakness or fainting    Shortness of breath with or without chest discomfort    New or worsening abdominal pain    Discomfort in other areas of your upper body such as your shoulders, jaw, neck, or arms  When to seek medical advice  Call your healthcare provider right away if any of these occur:    Increasing chest pain with breathing    Fever of 100.4 F (38 C) or above, or as directed by your healthcare provider    Congested cough, nausea, or vomiting  Louisa last reviewed this educational content on 4/1/2018 2000-2020 [employer]. [address] [place] [address], [place] [address]. All rights reserved. This information is not intended as a substitute for professional medical care. Always follow your healthcare professional's instructions.

## 2020-12-14 NOTE — PROGRESS NOTES
Subjective  Duane M Geisler is a 70 year old male who presents for fall on ice.  Yesterday afternoon at the end of his chores he decided to walk out on the ice to see how thick it was.  There was a small dusting of snow on the ice.  He initially took 1 step onto the ice and instantly flew backward landing onto his back quite hard.  He had pain that started immediately.  It is hard for him to sleep.  There is no radiation of the pain.  He did not hit his head nor did he pass out.  It is only better if he sits in a soft chair resting.  No cough or shortness of breath.    Problem List/PMH: reviewed in EMR, and made relevant updates today.  Medications: reviewed in EMR, and made relevant updates today.  Allergies: reviewed in EMR, and made relevant updates today.    Social Hx:  Social History     Tobacco Use     Smoking status: Never Smoker     Smokeless tobacco: Never Used   Substance Use Topics     Alcohol use: Yes     Alcohol/week: 2.5 standard drinks     Comment: rare     Drug use: Never     Social History     Social History Narrative    , works for "[email or web address]      I reviewed social history and made relevant updates today.    Family Hx:   Family History   Problem Relation Age of Onset     Heart Disease Mother         Heart Disease,h/o valve replacement     Blood Disease Mother         Blood Disease,myelodysplasia     Diabetes Father         Diabetes     Cancer Maternal Grandmother         Cancer, lung cancer     Microcephaly Maternal Grandfather      Myocardial Infarction Maternal Grandfather        Objective  Vitals: reviewed in EMR.  BP (!) 134/98 (BP Location: Right arm, Patient Position: Sitting, Cuff Size: Adult Regular)   Pulse 90   Temp 96  F (35.6  C) (Tympanic)   Resp 16   Wt 94.3 kg (207 lb 14.4 oz)   SpO2 91%   BMI 28.20 kg/m      Gen: Pleasant male, NAD.  HEENT: MMM  Neck: Supple  Cardiovascular: Irregularly irregular  Pulm: Clear to auscultation bilaterally,  breathing easily  Chest: No posterior midline tenderness to palpation.  No pain elicited with lateral compression of rib cage.  No crepitus.  Neuro: Grossly intact  Skin: No concerning lesions.  Psychiatric: Normal affect and insight. Does not appear anxious or depressed.    X-ray of chest and ribs  Obtained today  A rib fracture is seen    Assessment    ICD-10-CM    1. Closed fracture of one rib of right side, initial encounter  S22.31XA HYDROcodone-acetaminophen (NORCO) 5-325 MG tablet   2. Fall due to slipping on ice or snow, initial encounter  W00.9XXA XR Ribs & Chest Rt 3vw     HYDROcodone-acetaminophen (NORCO) 5-325 MG tablet   3. Atrial fibrillation/flutter (H)  I48.91     I48.92     CHADS2-VASc score of 1 (age) on aspirin       Orders Placed This Encounter   Procedures     XR Ribs & Chest Rt 3vw       We had a lengthy discussion today with regards to rib fractures.  I have recommended deep breathing frequently to avoid splinting and pneumonia.  Some narcotic pain medication was prescribed and recommend least amount that is effective.  Warning signs discussed and prompt repeat evaluation recommended if symptoms persist or worsen.    Plan   -- Expected clinical course discussed   -- Medications and their side effects discussed  Patient Instructions    -- Voltaren gel   -- Norco at bedtime if needed   -- Ibuprofen throughout the day if needed   -- Rest   -- Deep breathing   -- Return if worse    Patient Education     Rib Fracture    You broke one or more ribs. This is called a rib fracture. Rib fractures don't need a cast like other bones. They will heal by themselves in about 4 to 6 weeks. The first 3 to 4 weeks will be the most painful. During this time deep breathing, coughing, or changing position from sitting to lying down, may cause the broken ends to move slightly.   Home care    Rest. You should not be doing any heavy lifting or strenuous exertion until the pain goes away.    It hurts to breathe when  you have a broken rib. This puts you at risk of getting pneumonia from poor airflow through your lungs. To prevent this:  ? Take several very deep breaths once an hour while you're awake. Breathe out through pursed lips as if you are blowing up a balloon. If possible, actually blow up a balloon or a rubber glove. This exercise builds up pressure inside the lung and prevents collapse of the small air sacs of the lung. This exercise may cause some pain at the site of injury. This is normal.  ? You may have gotten a breathing exercise device called an incentive spirometer. Use it at least 4 times a day, or as directed.    Apply an ice pack over the injured area for 15 to 20 minutes every 1 to 2 hours. You should do this for the first 24 to 48 hours. To make an ice pack, put ice cubes in a plastic bag that seals at the top. Wrap the bag in a clean, thin towel or cloth. Never put ice or an ice pack directly on the skin. Keep using ice packs as needed for the relief of pain and swelling.    You may use over-the-counter pain medicine to control pain, unless another pain medicine was prescribed. If you have chronic liver or kidney disease or ever had a stomach ulcer or GI (gastrointestinal) bleeding, talk with your healthcare provider before using these medicines.    If your pain is not controlled, contact your healthcare provider. Sometimes a stronger pain medicine may be needed. A nerve block can be done in case of severe pain. It will numb the nerve between the ribs.    Follow-up care  Follow up with your healthcare provider, or as advised. In rare cases, a broken rib will cause complications in the first few days that may not be clearly seen during your initial exam. This can include collapsed lung, bleeding around the lung or into the belly (abdomen), or pneumonia. So watch for the signs below.   If X-rays were taken, you will be told of any new findings that may affect your care.  Call 911  Call 911 if you have:      Dizziness, weakness or fainting    Shortness of breath with or without chest discomfort    New or worsening abdominal pain    Discomfort in other areas of your upper body such as your shoulders, jaw, neck, or arms  When to seek medical advice  Call your healthcare provider right away if any of these occur:    Increasing chest pain with breathing    Fever of 100.4 F (38 C) or above, or as directed by your healthcare provider    Congested cough, nausea, or vomiting  [employer] last reviewed this educational content on 4/1/2018 2000-2020 [employer]. [address] [place] [address]. All rights reserved. This information is not intended as a substitute for professional medical care. Always follow your healthcare professional's instructions.               Return if symptoms worsen or fail to improve.    Bryant Campos MD, FAAP, FACP  Internal Medicine & Pediatrics

## 2020-12-14 NOTE — NURSING NOTE
Chief Complaint   Patient presents with     Fall     Back Pain     Mid and low back      Patient is here for a fall that he had yesterday afternoon. He slipped on ice and landed on his back. He is having mid and low back pain.     Initial BP (!) 132/98 (BP Location: Right arm, Patient Position: Sitting, Cuff Size: Adult Regular)   Pulse 90   Temp 96  F (35.6  C) (Tympanic)   Resp 16   Wt 94.3 kg (207 lb 14.4 oz)   SpO2 91%   BMI 28.20 kg/m   Estimated body mass index is 28.2 kg/m  as calculated from the following:    Height as of 11/23/20: 1.829 m (6').    Weight as of this encounter: 94.3 kg (207 lb 14.4 oz).  Medication Reconciliation: complete    Josselyn Montilla MA

## 2020-12-17 ENCOUNTER — TELEPHONE (OUTPATIENT)
Dept: PEDIATRICS | Facility: OTHER | Age: 70
End: 2020-12-17

## 2020-12-17 NOTE — TELEPHONE ENCOUNTER
I thought I sent the report to him.  He broke 3 ribs.  No change to our plan.    Signed, Bryant Weeks MD, FAAP, FACP  Internal Medicine & Pediatrics

## 2020-12-17 NOTE — TELEPHONE ENCOUNTER
Patient informed he broke  3 ribs and no change to the plan Dr. Weeks and him discussed.     Josselyn Montilla CMA on 12/17/2020 at 1:34 PM

## 2020-12-17 NOTE — TELEPHONE ENCOUNTER
Patient has not gotten the topical gel pain reliever.   And has not heard from anyone what the radiologist has said.    Please call patient back.   Thank You Christina Bui on 12/17/2020 at 10:09 AM

## 2020-12-17 NOTE — TELEPHONE ENCOUNTER
Patient was informed Voltaren gel can be bought over-the-counter.     He is wondering if xray results are back. Please review results and make any recommendations necessary.     Josselyn Montilla CMA on 12/17/2020 at 12:04 PM

## 2020-12-27 ENCOUNTER — HEALTH MAINTENANCE LETTER (OUTPATIENT)
Age: 70
End: 2020-12-27

## 2021-04-28 ENCOUNTER — MYC MEDICAL ADVICE (OUTPATIENT)
Dept: PEDIATRICS | Facility: OTHER | Age: 71
End: 2021-04-28

## 2021-04-28 DIAGNOSIS — Z12.5 SCREENING FOR PROSTATE CANCER: Primary | ICD-10-CM

## 2021-05-04 DIAGNOSIS — Z12.5 SCREENING FOR PROSTATE CANCER: ICD-10-CM

## 2021-05-04 LAB — PSA SERPL-ACNC: 0.43 NG/ML

## 2021-05-04 PROCEDURE — 36415 COLL VENOUS BLD VENIPUNCTURE: CPT | Mod: ZL | Performed by: INTERNAL MEDICINE

## 2021-05-04 PROCEDURE — G0103 PSA SCREENING: HCPCS | Mod: ZL | Performed by: INTERNAL MEDICINE

## 2021-09-03 ENCOUNTER — ALLIED HEALTH/NURSE VISIT (OUTPATIENT)
Dept: FAMILY MEDICINE | Facility: OTHER | Age: 71
End: 2021-09-03
Attending: NURSE PRACTITIONER
Payer: MEDICARE

## 2021-09-03 DIAGNOSIS — Z20.822 EXPOSURE TO 2019 NOVEL CORONAVIRUS: Primary | ICD-10-CM

## 2021-09-03 DIAGNOSIS — Z20.822 ENCOUNTER FOR LABORATORY TESTING FOR COVID-19 VIRUS: ICD-10-CM

## 2021-09-03 PROCEDURE — C9803 HOPD COVID-19 SPEC COLLECT: HCPCS

## 2021-09-03 PROCEDURE — U0003 INFECTIOUS AGENT DETECTION BY NUCLEIC ACID (DNA OR RNA); SEVERE ACUTE RESPIRATORY SYNDROME CORONAVIRUS 2 (SARS-COV-2) (CORONAVIRUS DISEASE [COVID-19]), AMPLIFIED PROBE TECHNIQUE, MAKING USE OF HIGH THROUGHPUT TECHNOLOGIES AS DESCRIBED BY CMS-2020-01-R: HCPCS | Mod: ZL

## 2021-09-03 NOTE — NURSING NOTE
Chief Complaint   Patient presents with     Covid 19 Testing     exposure, runny nose       Patient swabbed for COVID-19 testing.  Farzad Gaviria LPN on 9/3/2021 at 5:34 PM

## 2021-09-05 ENCOUNTER — TELEPHONE (OUTPATIENT)
Dept: FAMILY MEDICINE | Facility: OTHER | Age: 71
End: 2021-09-05

## 2021-09-05 LAB — SARS-COV-2 RNA RESP QL NAA+PROBE: POSITIVE

## 2021-09-06 NOTE — TELEPHONE ENCOUNTER
Coronavirus (COVID-19) Notification    Reason for call  Notify of POSITIVE  COVID-19 lab result, assess symptoms,  review [facility] recommendations    Lab Result   Lab test for 2019-nCoV rRt-PCR or SARS-COV-2 PCR  Oropharyngeal AND/OR nasopharyngeal swabs were POSITIVE for 2019-nCoV RNA [OR] SARS-COV-2 RNA (COVID-19) RNA     We have been unable to reach Patient by phone at this time to notify of their Positive COVID-19 result.  Left voicemail message requesting a call back to [phone] [facility] for results.        POSITIVE COVID-19 Letter sent.    Debo Lizarraga LPN

## 2021-10-01 PROBLEM — I48.91 UNSPECIFIED ATRIAL FIBRILLATION (H): Status: ACTIVE | Noted: 2020-12-14

## 2021-10-09 ENCOUNTER — HEALTH MAINTENANCE LETTER (OUTPATIENT)
Age: 71
End: 2021-10-09

## 2021-11-19 DIAGNOSIS — I48.3 TYPICAL ATRIAL FLUTTER (H): ICD-10-CM

## 2021-11-19 RX ORDER — METOPROLOL TARTRATE 25 MG/1
TABLET, FILM COATED ORAL
Qty: 90 TABLET | Refills: 0 | Status: SHIPPED | OUTPATIENT
Start: 2021-11-19 | End: 2021-11-23

## 2022-01-04 ENCOUNTER — OFFICE VISIT (OUTPATIENT)
Dept: PEDIATRICS | Facility: OTHER | Age: 72
End: 2022-01-04
Attending: INTERNAL MEDICINE
Payer: COMMERCIAL

## 2022-01-04 VITALS
DIASTOLIC BLOOD PRESSURE: 82 MMHG | SYSTOLIC BLOOD PRESSURE: 130 MMHG | WEIGHT: 202 LBS | HEIGHT: 72 IN | OXYGEN SATURATION: 97 % | RESPIRATION RATE: 16 BRPM | TEMPERATURE: 97.6 F | HEART RATE: 86 BPM | BODY MASS INDEX: 27.36 KG/M2

## 2022-01-04 DIAGNOSIS — Z23 NEED FOR VACCINATION: ICD-10-CM

## 2022-01-04 DIAGNOSIS — E78.2 MIXED HYPERLIPIDEMIA: ICD-10-CM

## 2022-01-04 DIAGNOSIS — Z13.6 ENCOUNTER FOR ABDOMINAL AORTIC ANEURYSM (AAA) SCREENING: ICD-10-CM

## 2022-01-04 DIAGNOSIS — Z91.89 10 YEAR RISK OF MI OR STROKE 7.5% OR GREATER: ICD-10-CM

## 2022-01-04 DIAGNOSIS — I48.3 TYPICAL ATRIAL FLUTTER (H): ICD-10-CM

## 2022-01-04 DIAGNOSIS — I48.91 ATRIAL FIBRILLATION, UNSPECIFIED TYPE (H): ICD-10-CM

## 2022-01-04 DIAGNOSIS — Z00.00 ENCOUNTER FOR MEDICARE ANNUAL WELLNESS EXAM: Primary | ICD-10-CM

## 2022-01-04 LAB
ANION GAP SERPL CALCULATED.3IONS-SCNC: 8 MMOL/L (ref 3–14)
BUN SERPL-MCNC: 18 MG/DL (ref 7–25)
CALCIUM SERPL-MCNC: 9.8 MG/DL (ref 8.6–10.3)
CHLORIDE BLD-SCNC: 106 MMOL/L (ref 98–107)
CHOLEST SERPL-MCNC: 226 MG/DL
CO2 SERPL-SCNC: 25 MMOL/L (ref 21–31)
CREAT SERPL-MCNC: 1.08 MG/DL (ref 0.7–1.3)
ERYTHROCYTE [DISTWIDTH] IN BLOOD BY AUTOMATED COUNT: 13.2 % (ref 10–15)
FASTING STATUS PATIENT QL REPORTED: NO
GFR SERPL CREATININE-BSD FRML MDRD: 72 ML/MIN/1.73M2
GLUCOSE BLD-MCNC: 100 MG/DL (ref 70–105)
HCT VFR BLD AUTO: 40.9 % (ref 40–53)
HDLC SERPL-MCNC: 60 MG/DL (ref 23–92)
HGB BLD-MCNC: 14.3 G/DL (ref 13.3–17.7)
LDLC SERPL CALC-MCNC: 140 MG/DL
MCH RBC QN AUTO: 30.6 PG (ref 26.5–33)
MCHC RBC AUTO-ENTMCNC: 35 G/DL (ref 31.5–36.5)
MCV RBC AUTO: 87 FL (ref 78–100)
NONHDLC SERPL-MCNC: 166 MG/DL
PLATELET # BLD AUTO: 280 10E3/UL (ref 150–450)
POTASSIUM BLD-SCNC: 4.3 MMOL/L (ref 3.5–5.1)
RBC # BLD AUTO: 4.68 10E6/UL (ref 4.4–5.9)
SODIUM SERPL-SCNC: 139 MMOL/L (ref 134–144)
TRIGL SERPL-MCNC: 131 MG/DL
WBC # BLD AUTO: 7.7 10E3/UL (ref 4–11)

## 2022-01-04 PROCEDURE — 36415 COLL VENOUS BLD VENIPUNCTURE: CPT | Mod: ZL | Performed by: INTERNAL MEDICINE

## 2022-01-04 PROCEDURE — 85027 COMPLETE CBC AUTOMATED: CPT | Mod: ZL | Performed by: INTERNAL MEDICINE

## 2022-01-04 PROCEDURE — G0008 ADMIN INFLUENZA VIRUS VAC: HCPCS

## 2022-01-04 PROCEDURE — 80048 BASIC METABOLIC PNL TOTAL CA: CPT | Mod: ZL | Performed by: INTERNAL MEDICINE

## 2022-01-04 PROCEDURE — G0439 PPPS, SUBSEQ VISIT: HCPCS | Performed by: INTERNAL MEDICINE

## 2022-01-04 PROCEDURE — 80061 LIPID PANEL: CPT | Mod: ZL | Performed by: INTERNAL MEDICINE

## 2022-01-04 RX ORDER — ZOSTER VACCINE RECOMBINANT, ADJUVANTED 50 MCG/0.5
1 KIT INTRAMUSCULAR ONCE
Qty: 0.5 ML | Refills: 1 | Status: SHIPPED | OUTPATIENT
Start: 2022-01-04 | End: 2022-01-04

## 2022-01-04 RX ORDER — METOPROLOL TARTRATE 25 MG/1
25 TABLET, FILM COATED ORAL 2 TIMES DAILY
Qty: 180 TABLET | Refills: 3 | Status: SHIPPED | OUTPATIENT
Start: 2022-01-04 | End: 2023-01-30

## 2022-01-04 ASSESSMENT — MIFFLIN-ST. JEOR: SCORE: 1699.27

## 2022-01-04 ASSESSMENT — PAIN SCALES - GENERAL: PAINLEVEL: NO PAIN (0)

## 2022-01-04 ASSESSMENT — ACTIVITIES OF DAILY LIVING (ADL): CURRENT_FUNCTION: NO ASSISTANCE NEEDED

## 2022-01-04 NOTE — PROGRESS NOTES
SUBJECTIVE:   Duane M Geisler is a 71 year old male who presents for Preventive Visit.      Patient has been advised of split billing requirements and indicates understanding: Yes   Are you in the first 12 months of your Medicare coverage?  No    HPI  Do you feel safe in your environment? Yes    Have you ever done Advance Care Planning? (For example, a Health Directive, POLST, or a discussion with a medical provider or your loved ones about your wishes): No, advance care planning information given to patient to review.  Patient plans to discuss their wishes with loved ones or provider.        Right Ear:      1000 Hz RESPONSE- on Level:   20 db  (Conditioning sound)   1000 Hz: RESPONSE- on Level:   20 db    2000 Hz: RESPONSE- on Level:   20 db    4000 Hz: RESPONSE- on Level: 40 db    Left Ear:      4000 Hz: RESPONSE- on Level: 40 db   2000 Hz: RESPONSE- on Level:   20 db    1000 Hz: RESPONSE- on Level:   20 db     500 Hz: RESPONSE- on Level:   20 db     Right Ear:    500 Hz: RESPONSE- on Level:   20 db     Hearing Acuity: Pass    Hearing Assessment: normal   Fall risk  Fallen 2 or more times in the past year?: No  Any fall with injury in the past year?: No  click delete button to remove this line now  Cognitive Screening   1) Repeat 3 items (Leader, Season, Table)    2) Clock draw: NORMAL  3) 3 item recall: Recalls 1 object   Results: NORMAL clock, 1-2 items recalled: COGNITIVE IMPAIRMENT LESS LIKELY    Mini-CogTM Copyright MAYRA Goode. Licensed by the author for use in [facility]; reprinted with permission (tea@.[email or web address]). All rights reserved.      Do you have sleep apnea, excessive snoring or daytime drowsiness?: yes    Reviewed and updated as needed this visit by clinical staff  Tobacco  Allergies  Meds  Problems  Med Hx  Surg Hx  Fam Hx  Soc Hx         Reviewed and updated as needed this visit by Provider               Social History     Tobacco Use     Smoking status: Never Smoker     Smokeless  tobacco: Never Used   Substance Use Topics     Alcohol use: Not Currently     Alcohol/week: 2.5 standard drinks     Comment: rare     If you drink alcohol do you typically have >3 drinks per day or >7 drinks per week? No    Alcohol Use 1/4/2022   Prescreen: >3 drinks/day or >7 drinks/week? No             Current providers sharing in care for this patient include:   Patient Care Team:  Bryant Weeks MD as PCP - General (Pediatrics)  Bryant Weeks MD as Assigned PCP   [facility]- Dentist      The following health maintenance items are reviewed in Epic and correct as of today:  Health Maintenance Due   Topic Date Due     ADVANCE CARE PLANNING  Never done     DTAP/TDAP/TD IMMUNIZATION (1 - Tdap) Never done     ZOSTER IMMUNIZATION (1 of 2) Never done     MEDICARE ANNUAL WELLNESS VISIT  Never done     AORTIC ANEURYSM SCREENING (SYSTEM ASSIGNED)  Never done     INFLUENZA VACCINE (1) 09/01/2021     Labs reviewed in EPIC  Immunizations reviewed        Review of Systems  Constitutional, HEENT, cardiovascular, pulmonary, gi and gu systems are negative, except as otherwise noted.    OBJECTIVE:   /82   Pulse 86   Temp 97.6  F (36.4  C) (Tympanic)   Resp 16   Ht 1.829 m (6')   Wt 91.6 kg (202 lb)   SpO2 97%   BMI 27.40 kg/m   Estimated body mass index is 27.4 kg/m  as calculated from the following:    Height as of this encounter: 1.829 m (6').    Weight as of this encounter: 91.6 kg (202 lb).  Physical Exam  Gen: Pleasant male, NAD.  HEENT: MMM, no OP erythema.   Neck: Supple, no JVD, no bruits.  CV: RRR no m/r/g.   Pulm: CTAB no w/r/r  Neuro: Grossly intact  Msk: No lower extremity edema.  Skin: No concerning lesions.  Psychiatric: Normal affect and insight. Does not appear anxious or depressed.      Diagnostic Test Results:  Labs reviewed in Epic    ASSESSMENT / PLAN:       ICD-10-CM    1. Encounter for Medicare annual wellness exam  Z00.00    2. 10 year risk of MI or stroke 7.5% or  greater  Z91.89 Lipid Profile     Lipid Profile   3. Mixed hyperlipidemia  E78.2 Lipid Profile     Basic metabolic panel     Lipid Profile     Basic metabolic panel   4. Atrial fibrillation, unspecified type (H)  I48.91 CBC with platelets     CBC with platelets   5. Encounter for abdominal aortic aneurysm (AAA) screening  Z13.6 US Aorta Medicare AAA Screening   6. Need for vaccination  Z23 INFLUENZA, QUAD, HIGH DOSE, PF, 65YR + (FLUZONE HD)     zoster vaccine recombinant adjuvanted (SHINGRIX) injection     Tdap, tetanus-diptheria-acell pertussis, (BOOSTRIX) 5-2.5-18.5 LF-MCG/0.5 SUSP injection   7. Typical atrial flutter (H)  I48.3 metoprolol tartrate (LOPRESSOR) 25 MG tablet         Patient has been advised of split billing requirements and indicates understanding:   COUNSELING:  Reviewed preventive health counseling, as reflected in patient instructions    Estimated body mass index is 27.4 kg/m  as calculated from the following:    Height as of this encounter: 1.829 m (6').    Weight as of this encounter: 91.6 kg (202 lb).    Weight management plan: Discussed healthy diet and exercise guidelines    He reports that he has never smoked. He has never used smokeless tobacco.      Appropriate preventive services were discussed with this patient, including applicable screening as appropriate for cardiovascular disease, diabetes, osteopenia/osteoporosis, and glaucoma.  As appropriate for age/gender, discussed screening for colorectal cancer, prostate cancer, breast cancer, and cervical cancer. Checklist reviewing preventive services available has been given to the patient.    Reviewed patients plan of care and provided an AVS. The Basic Care Plan (routine screening as documented in Health Maintenance) for Duane meets the Care Plan requirement. This Care Plan has been established and reviewed with the Patient.    Counseling Resources:  ATP IV Guidelines  Pooled Cohorts Equation Calculator  Breast Cancer Risk  Calculator  Breast Cancer: Medication to Reduce Risk  FRAX Risk Assessment  ICSI Preventive Guidelines  Dietary Guidelines for Americans, 2010  [employer]'s MyPlate  ASA Prophylaxis  Lung CA Screening    Bryant Weeks MD  [facility]    Identified Health Risks:

## 2022-01-04 NOTE — NURSING NOTE
Chief Complaint   Patient presents with     Medicare Visit         Initial /82   Pulse 86   Temp 97.6  F (36.4  C) (Tympanic)   Resp 16   Ht 1.829 m (6')   Wt 91.6 kg (202 lb)   SpO2 97%   BMI 27.40 kg/m   Estimated body mass index is 27.4 kg/m  as calculated from the following:    Height as of this encounter: 1.829 m (6').    Weight as of this encounter: 91.6 kg (202 lb).       Medication Reconciliation: Complete      FOOD SECURITY SCREENING QUESTIONS:    The next two questions are to help us understand your food security.  If you are feeling you need any assistance in this area, we have resources available to support you today.    Hunger Vital Signs:  Have you worried about running out of food and not having money to buy more?Never        Norma J. Gosselin, LPN

## 2022-01-04 NOTE — PATIENT INSTRUCTIONS
Patient Education   Personalized Prevention Plan  You are due for the preventive services outlined below.  Your care team is available to assist you in scheduling these services.  If you have already completed any of these items, please share that information with your care team to update in your medical record.  Health Maintenance Due   Topic Date Due     Diptheria Tetanus Pertussis (DTAP/TDAP/TD) Vaccine (1 - Tdap) Never done     Zoster (Shingles) Vaccine (1 of 2) Never done     AORTIC ANEURYSM SCREENING (SYSTEM ASSIGNED)  Never done     Flu Vaccine (1) 09/01/2021           The 10-year ASCVD risk score (Alexey HARLEY Jr., et al., 2013) is: 22.2%    Values used to calculate the score:      Age: 71 years      Sex: Male      Is Non- : No      Diabetic: No      Tobacco smoker: No      Systolic Blood Pressure: 130 mmHg      Is BP treated: No      HDL Cholesterol: 55 mg/dL      Total Cholesterol: 211 mg/dL     -- Reconsider a statin   -- Continue aspirin 81   -- Increase metoprolol to a full tablet twice daily for ease of use, should be tolerated   -- Monitor pulse and blood pressure

## 2022-01-06 ENCOUNTER — HOSPITAL ENCOUNTER (OUTPATIENT)
Dept: ULTRASOUND IMAGING | Facility: OTHER | Age: 72
Discharge: HOME | End: 2022-01-06
Attending: INTERNAL MEDICINE | Admitting: INTERNAL MEDICINE
Payer: MEDICARE

## 2022-01-06 DIAGNOSIS — Z13.6 ENCOUNTER FOR ABDOMINAL AORTIC ANEURYSM (AAA) SCREENING: ICD-10-CM

## 2022-01-06 PROCEDURE — 76706 US ABDL AORTA SCREEN AAA: CPT

## 2022-01-28 ENCOUNTER — TELEPHONE (OUTPATIENT)
Dept: PEDIATRICS | Facility: OTHER | Age: 72
End: 2022-01-28
Payer: COMMERCIAL

## 2022-01-28 DIAGNOSIS — I48.3 TYPICAL ATRIAL FLUTTER (H): ICD-10-CM

## 2022-01-28 RX ORDER — METOPROLOL TARTRATE 25 MG/1
TABLET, FILM COATED ORAL
Qty: 90 TABLET | Refills: 0 | OUTPATIENT
Start: 2022-01-28

## 2022-01-28 NOTE — TELEPHONE ENCOUNTER
Left message for patient to contact his pharmacy for his prescription that will be filled.    [facility] confirmed they have the newest prescription and patient called in for a refill off of the old bottle.      Lo Beach LPN 1/28/2022 12:06 PM

## 2022-01-28 NOTE — TELEPHONE ENCOUNTER
[id number] sent Rx request for the following:   Requested Prescriptions   Pending Prescriptions Disp Refills     metoprolol tartrate (LOPRESSOR) 25 MG tablet [Pharmacy Med Name: Metoprolol Tartrate 25 MG Oral Tablet] 90 tablet 0     Sig: Take 1/2 (one-half) tablet by mouth twice daily       Beta-Blockers Protocol Passed - 1/28/2022  5:30 AM      Last Prescription Date:   11/23/2021-1/4/22  Last Fill Qty/Refills:         90, R-3    Last Office Visit:              1/4/2022 Weeks   Future Office visit:           None noted   Medication is being denied due to: Adjustment in therapy  Per note 1/4/2022  (Increase metoprolol to a full tablet twice daily for ease of use should be tolerated )  Melonie Mccabe RN ....................  1/28/2022   8:25 AM

## 2022-01-28 NOTE — TELEPHONE ENCOUNTER
Please call the patient.  An RX  Did not get to the pharmacy.  He is unsure of the name of it.   Pharmacy is [facility].      Rajwinder García on 1/28/2022 at 9:46 AM

## 2022-06-21 ENCOUNTER — OFFICE VISIT (OUTPATIENT)
Dept: INTERNAL MEDICINE | Facility: OTHER | Age: 72
End: 2022-06-21
Attending: INTERNAL MEDICINE
Payer: COMMERCIAL

## 2022-06-21 VITALS
WEIGHT: 199 LBS | HEART RATE: 90 BPM | SYSTOLIC BLOOD PRESSURE: 98 MMHG | RESPIRATION RATE: 20 BRPM | TEMPERATURE: 98.7 F | DIASTOLIC BLOOD PRESSURE: 64 MMHG | BODY MASS INDEX: 26.99 KG/M2 | OXYGEN SATURATION: 97 %

## 2022-06-21 DIAGNOSIS — R21 RASH: Primary | ICD-10-CM

## 2022-06-21 PROCEDURE — 99213 OFFICE O/P EST LOW 20 MIN: CPT | Performed by: INTERNAL MEDICINE

## 2022-06-21 PROCEDURE — G0463 HOSPITAL OUTPT CLINIC VISIT: HCPCS

## 2022-06-21 ASSESSMENT — ENCOUNTER SYMPTOMS
CONSTITUTIONAL NEGATIVE: 1
ALLERGIC/IMMUNOLOGIC NEGATIVE: 1
ENDOCRINE NEGATIVE: 1
EYES NEGATIVE: 1

## 2022-06-21 ASSESSMENT — PAIN SCALES - GENERAL: PAINLEVEL: NO PAIN (0)

## 2022-06-21 NOTE — NURSING NOTE
Patient here for itching rash on the palms of the hands. Then it switches to his feet, arms and legs. Medication Reconciliation: complete.    Juliet Virk LPN  6/21/2022 3:16 PM

## 2022-06-21 NOTE — PROGRESS NOTES
Chief Complaint:  Rash.    HPI: He is complaining of a rash.  This has been something that has been bothering him intermittently for the last 6 months or so.  He will get a red patchy area that is somewhat irritating and uncomfortable and it will last for a few days and then go away.  He tells me that he has an over-the-counter cream that he puts on them and he also uses a Benadryl spray.  No new medications that he is aware of although his wife does give him various vitamins.  He is otherwise quite healthy.  He does not really have a rash to see today.    Past Medical History:   Diagnosis Date     Other specified postprocedural states     12/2002,normal f/u 10 years     Personal history of other medical treatment (CODE)     5/2003, egd for food obstruction     Tubulo-interstitial nephritis     1985       Past Surgical History:   Procedure Laterality Date     COLONOSCOPY      12/2002, normal f/u 10 years     COLONOSCOPY  03/05/2014    3/2014,Moderate diverticulosis - follow up 10 years     ESOPHAGOSCOPY, GASTROSCOPY, DUODENOSCOPY (EGD), COMBINED      5/2003, food obstruction       No Known Allergies    Current Outpatient Medications   Medication Sig Dispense Refill     aspirin (ASA) 81 MG chewable tablet Take 1 tablet (81 mg) by mouth daily       metoprolol tartrate (LOPRESSOR) 25 MG tablet Take 1 tablet (25 mg) by mouth 2 times daily 180 tablet 3       Review of Systems   Constitutional: Negative.    Eyes: Negative.    Endocrine: Negative.    Allergic/Immunologic: Negative.        Physical Exam  Vitals and nursing note reviewed.   Constitutional:       General: He is not in acute distress.     Appearance: Normal appearance. He is not ill-appearing, toxic-appearing or diaphoretic.   Skin:     General: Skin is warm and dry.      Comments: No rash present today.   Neurological:      Mental Status: He is alert.         Assessment:        ICD-10-CM    1. Rash  R21        Plan: It sounds as though he has some sort of an  allergic rash occurring although without being able to see a representative sample, makes it very difficult to say with certainty.  I recommended loratadine 10 mg daily and oral diphenhydramine on a as needed basis.  I did instruct him that if he does get a significant rash that he should take a picture of this and bring it in with him at his next visit.  He will also check with his wife to make sure that he is not getting any sort of new vitamins, etc.    Answers for HPI/ROS submitted by the patient on 6/21/2022  How many servings of fruits and vegetables do you eat daily?: 2-3  On average, how many sweetened beverages do you drink each day (Examples: soda, juice, sweet tea, etc.  Do NOT count diet or artificially sweetened beverages)?: 3  How many minutes a day do you exercise enough to make your heart beat faster?: 9 or less  How many days a week do you exercise enough to make your heart beat faster?: 3 or less  How many days per week do you miss taking your medication?: 0  What is the reason for your visit today?: Rash  When did your symptoms begin?: 3-4 weeks ago  How would you describe these symptoms?: Mild  Are your symptoms:: Worsening  Have you had these symptoms before?: No  Is there anything that makes you feel worse?: Just mostly in the evenings  Is there anything that makes you feel better?: Putting Benadryl on the area of skin that there pain.

## 2022-09-27 ENCOUNTER — TELEPHONE (OUTPATIENT)
Dept: PEDIATRICS | Facility: OTHER | Age: 72
End: 2022-09-27

## 2022-09-27 DIAGNOSIS — R21 RASH: Primary | ICD-10-CM

## 2022-09-27 NOTE — TELEPHONE ENCOUNTER
Reason for call: Request for a referral.    Referral requested for what concern? Rash    Have you already been seen by the specialty you need the referral for?  NO    If yes, Date:   ,  Location:   ,  Provider:       If no,  Where do you want to go?   [facility]    Additional comments:   Ongoing since summer and not getting better.    Preferred method for responding to this message: Telephone Call    Phone number patient can be reached at? Home number on file [phone] (home)    If we can't reach you directly, may we leave a detailed response at the number you provided? Yes

## 2022-09-27 NOTE — TELEPHONE ENCOUNTER
After confirming last name and birthdate. Informed of referral sent to [facility]. Dayna Eisenberg on 9/27/2022 at 3:41 PM

## 2023-01-30 ENCOUNTER — OFFICE VISIT (OUTPATIENT)
Dept: PEDIATRICS | Facility: OTHER | Age: 73
End: 2023-01-30
Attending: INTERNAL MEDICINE
Payer: COMMERCIAL

## 2023-01-30 VITALS
HEIGHT: 72 IN | DIASTOLIC BLOOD PRESSURE: 70 MMHG | WEIGHT: 206.5 LBS | BODY MASS INDEX: 27.97 KG/M2 | HEART RATE: 90 BPM | SYSTOLIC BLOOD PRESSURE: 128 MMHG | RESPIRATION RATE: 20 BRPM | TEMPERATURE: 97.5 F | OXYGEN SATURATION: 100 %

## 2023-01-30 DIAGNOSIS — R39.198 URINARY STREAM SLOWING: ICD-10-CM

## 2023-01-30 DIAGNOSIS — I48.3 TYPICAL ATRIAL FLUTTER (H): ICD-10-CM

## 2023-01-30 DIAGNOSIS — I48.21 PERMANENT ATRIAL FIBRILLATION (H): ICD-10-CM

## 2023-01-30 DIAGNOSIS — L50.8 CHRONIC URTICARIA: ICD-10-CM

## 2023-01-30 DIAGNOSIS — Z13.1 SCREENING FOR DIABETES MELLITUS: ICD-10-CM

## 2023-01-30 DIAGNOSIS — Z00.00 ENCOUNTER FOR MEDICARE ANNUAL WELLNESS EXAM: Primary | ICD-10-CM

## 2023-01-30 DIAGNOSIS — Z79.82 LONG TERM (CURRENT) USE OF ASPIRIN: ICD-10-CM

## 2023-01-30 DIAGNOSIS — E78.2 MIXED HYPERLIPIDEMIA: ICD-10-CM

## 2023-01-30 DIAGNOSIS — R35.1 NOCTURIA: ICD-10-CM

## 2023-01-30 LAB
ANION GAP SERPL CALCULATED.3IONS-SCNC: 6 MMOL/L (ref 7–15)
BUN SERPL-MCNC: 16.4 MG/DL (ref 8–23)
CALCIUM SERPL-MCNC: 9.6 MG/DL (ref 8.8–10.2)
CHLORIDE SERPL-SCNC: 106 MMOL/L (ref 98–107)
CHOLEST SERPL-MCNC: 224 MG/DL
CREAT SERPL-MCNC: 1.05 MG/DL (ref 0.67–1.17)
DEPRECATED HCO3 PLAS-SCNC: 29 MMOL/L (ref 22–29)
ERYTHROCYTE [DISTWIDTH] IN BLOOD BY AUTOMATED COUNT: 13.5 % (ref 10–15)
GFR SERPL CREATININE-BSD FRML MDRD: 74 ML/MIN/1.73M2
GLUCOSE SERPL-MCNC: 93 MG/DL (ref 70–99)
HCT VFR BLD AUTO: 41.7 % (ref 40–53)
HDLC SERPL-MCNC: 53 MG/DL
HGB BLD-MCNC: 14.1 G/DL (ref 13.3–17.7)
HOLD SPECIMEN: NORMAL
LDLC SERPL CALC-MCNC: 123 MG/DL
MCH RBC QN AUTO: 30.4 PG (ref 26.5–33)
MCHC RBC AUTO-ENTMCNC: 33.8 G/DL (ref 31.5–36.5)
MCV RBC AUTO: 90 FL (ref 78–100)
NONHDLC SERPL-MCNC: 171 MG/DL
PLATELET # BLD AUTO: 303 10E3/UL (ref 150–450)
POTASSIUM SERPL-SCNC: 4.4 MMOL/L (ref 3.4–5.3)
PSA SERPL-MCNC: 0.5 NG/ML (ref 0–6.5)
RBC # BLD AUTO: 4.64 10E6/UL (ref 4.4–5.9)
SODIUM SERPL-SCNC: 141 MMOL/L (ref 136–145)
TRIGL SERPL-MCNC: 238 MG/DL
WBC # BLD AUTO: 9.1 10E3/UL (ref 4–11)

## 2023-01-30 PROCEDURE — 80061 LIPID PANEL: CPT | Mod: ZL | Performed by: INTERNAL MEDICINE

## 2023-01-30 PROCEDURE — 36415 COLL VENOUS BLD VENIPUNCTURE: CPT | Mod: ZL | Performed by: INTERNAL MEDICINE

## 2023-01-30 PROCEDURE — 84153 ASSAY OF PSA TOTAL: CPT | Mod: ZL | Performed by: INTERNAL MEDICINE

## 2023-01-30 PROCEDURE — 80048 BASIC METABOLIC PNL TOTAL CA: CPT | Mod: ZL | Performed by: INTERNAL MEDICINE

## 2023-01-30 PROCEDURE — G0439 PPPS, SUBSEQ VISIT: HCPCS | Mod: 52 | Performed by: INTERNAL MEDICINE

## 2023-01-30 PROCEDURE — 85027 COMPLETE CBC AUTOMATED: CPT | Mod: ZL | Performed by: INTERNAL MEDICINE

## 2023-01-30 PROCEDURE — G0463 HOSPITAL OUTPT CLINIC VISIT: HCPCS

## 2023-01-30 RX ORDER — METOPROLOL TARTRATE 25 MG/1
25 TABLET, FILM COATED ORAL 2 TIMES DAILY
Qty: 180 TABLET | Refills: 3 | Status: SHIPPED | OUTPATIENT
Start: 2023-01-30 | End: 2023-02-07

## 2023-01-30 RX ORDER — CETIRIZINE HYDROCHLORIDE 10 MG/1
1 TABLET ORAL
COMMUNITY
Start: 2023-01-19 | End: 2024-02-15

## 2023-01-30 RX ORDER — HYDROXYZINE HYDROCHLORIDE 50 MG/1
TABLET, FILM COATED ORAL
COMMUNITY
Start: 2023-01-19 | End: 2024-04-08

## 2023-01-30 RX ORDER — FAMOTIDINE 20 MG/1
1 TABLET, FILM COATED ORAL
COMMUNITY
Start: 2023-01-19 | End: 2024-02-15

## 2023-01-30 RX ORDER — ATORVASTATIN CALCIUM 10 MG/1
10 TABLET, FILM COATED ORAL DAILY
Qty: 90 TABLET | Refills: 3 | Status: SHIPPED | OUTPATIENT
Start: 2023-01-30 | End: 2024-02-15

## 2023-01-30 ASSESSMENT — ENCOUNTER SYMPTOMS
CONSTIPATION: 0
DIZZINESS: 0
NERVOUS/ANXIOUS: 0
FREQUENCY: 1
PALPITATIONS: 0
NAUSEA: 0
HEMATURIA: 0
DYSURIA: 0
WEAKNESS: 0
ABDOMINAL PAIN: 0
HEADACHES: 0
MYALGIAS: 0
SORE THROAT: 0
DIARRHEA: 0
PARESTHESIAS: 1
FEVER: 0
ARTHRALGIAS: 0
HEMATOCHEZIA: 0
COUGH: 0
JOINT SWELLING: 0
CHILLS: 0
HEARTBURN: 0

## 2023-01-30 ASSESSMENT — PAIN SCALES - GENERAL: PAINLEVEL: NO PAIN (0)

## 2023-01-30 ASSESSMENT — ACTIVITIES OF DAILY LIVING (ADL): CURRENT_FUNCTION: NO ASSISTANCE NEEDED

## 2023-01-30 ASSESSMENT — PATIENT HEALTH QUESTIONNAIRE - PHQ9: SUM OF ALL RESPONSES TO PHQ QUESTIONS 1-9: 3

## 2023-01-30 NOTE — PROGRESS NOTES
"SUBJECTIVE:   Duane is a 72 year old who presents for Preventive Visit.    He is finding he has to urinate more at nighttime.  Stream is variable.    Patient has been advised of split billing requirements and indicates understanding: Yes  Are you in the first 12 months of your Medicare coverage?  No    Healthy Habits:     In general, how would you rate your overall health?  Good    Frequency of exercise:  2-3 days/week    Duration of exercise:  15-30 minutes    Do you usually eat at least 4 servings of fruit and vegetables a day, include whole grains    & fiber and avoid regularly eating high fat or \"junk\" foods?  No    Taking medications regularly:  Yes    Medication side effects:  None    Ability to successfully perform activities of daily living:  No assistance needed    Home Safety:  No safety concerns identified    Hearing Impairment:  No hearing concerns    In the past 6 months, have you been bothered by leaking of urine?  No    In general, how would you rate your overall mental or emotional health?  Excellent      PHQ-2 Total Score: 2    Additional concerns today:  Yes      Have you ever done Advance Care Planning? (For example, a Health Directive, POLST, or a discussion with a medical provider or your loved ones about your wishes): No, advance care planning information given to patient to review.  Patient plans to discuss their wishes with loved ones or provider.         Fall risk  Fallen 2 or more times in the past year?: No  Any fall with injury in the past year?: No    Cognitive Screening   1) Repeat 3 items (Leader, Season, Table)    2) Clock draw: NORMAL  3) 3 item recall: Recalls 1 object   Results: NORMAL clock, 1-2 items recalled: COGNITIVE IMPAIRMENT LESS LIKELY    Mini-CogTM Copyright MAYRA Goode. Licensed by the author for use in [facility]; reprinted with permission (tea@.[facility]). All rights reserved.      Do you have sleep apnea, excessive snoring or daytime drowsiness?: " no    Reviewed and updated as needed this visit by clinical staff   Tobacco  Allergies  Meds   Med Hx  Surg Hx  Fam Hx  Soc Hx        Reviewed and updated as needed this visit by Provider                 Social History     Tobacco Use     Smoking status: Never     Smokeless tobacco: Never   Substance Use Topics     Alcohol use: Not Currently     Alcohol/week: 2.5 standard drinks     Comment: rare     If you drink alcohol do you typically have >3 drinks per day or >7 drinks per week? Not applicable    Alcohol Use 1/30/2023   Prescreen: >3 drinks/day or >7 drinks/week? No   Prescreen: >3 drinks/day or >7 drinks/week? -   No flowsheet data found.            Current providers sharing in care for this patient include:   Patient Care Team:  Bryant Weeks MD as PCP - General (Pediatrics)  Bryant Weeks MD as Assigned PCP    The following health maintenance items are reviewed in Epic and correct as of today:  Health Maintenance   Topic Date Due     DTAP/TDAP/TD IMMUNIZATION (1 - Tdap) Never done     ZOSTER IMMUNIZATION (2 of 2) 12/26/2022     MEDICARE ANNUAL WELLNESS VISIT  01/04/2023     FALL RISK ASSESSMENT  01/30/2024     COLORECTAL CANCER SCREENING  03/05/2024     LIPID  01/04/2027     ADVANCE CARE PLANNING  01/04/2027     HEPATITIS C SCREENING  Completed     PHQ-2 (once per calendar year)  Completed     INFLUENZA VACCINE  Completed     Pneumococcal Vaccine: 65+ Years  Completed     AORTIC ANEURYSM SCREENING (SYSTEM ASSIGNED)  Completed     COVID-19 Vaccine  Completed     IPV IMMUNIZATION  Aged Out     MENINGITIS IMMUNIZATION  Aged Out     Labs reviewed in EPIC  Imm reviewed        Review of Systems  Constitutional, HEENT, cardiovascular, pulmonary, gi and gu systems are negative, except as otherwise noted.    OBJECTIVE:   /70   Pulse 90   Temp 97.5  F (36.4  C) (Tympanic)   Resp 20   Ht 1.829 m (6')   Wt 93.7 kg (206 lb 8 oz)   SpO2 100%   BMI 28.01 kg/m   Estimated body mass index  is 28.01 kg/m  as calculated from the following:    Height as of this encounter: 1.829 m (6').    Weight as of this encounter: 93.7 kg (206 lb 8 oz).  Physical Exam  HEENT: No carotid bruits  Cardiovascular: Irregularly irregular, no murmur  Pulmonary: Clear    Diagnostic Test Results:  Labs reviewed in Epic    ASSESSMENT / PLAN:       ICD-10-CM    1. Encounter for Medicare annual wellness exam  Z00.00       2. Permanent atrial fibrillation (H)  I48.21       3. Typical atrial flutter (H)  I48.3 metoprolol tartrate (LOPRESSOR) 25 MG tablet      4. Urinary stream slowing  R39.198 Prostate Specific Antigen     Prostate Specific Antigen      5. Screening for diabetes mellitus  Z13.1 Basic metabolic panel     Basic metabolic panel      6. Mixed hyperlipidemia  E78.2 Lipid Profile     Lipid Profile     atorvastatin (LIPITOR) 10 MG tablet      7. Long term (current) use of aspirin  Z79.82 CBC with platelets     CBC with platelets      8. Nocturia  R35.1 Prostate Specific Antigen     Prostate Specific Antigen      9. Chronic urticaria  L50.8           With regards to his urinary symptoms I think most likely he has overactive bladder however differential diagnosis also includes benign prostate enlargement or others.  Low threshold for expert consultation if symptoms persist or worsen.    With regards to atrial fibrillation he has a [id number] score of 1 however this will go up to 2 once he turns 73 this summer.  I recommended initiating Eliquis.  He plans to think about this further.      COUNSELING:  Reviewed preventive health counseling, as reflected in patient instructions      BMI:   Estimated body mass index is 28.01 kg/m  as calculated from the following:    Height as of this encounter: 1.829 m (6').    Weight as of this encounter: 93.7 kg (206 lb 8 oz).   Weight management plan: Discussed healthy diet and exercise guidelines      He reports that he has never smoked. He has never used smokeless  tobacco.      Appropriate preventive services were discussed with this patient, including applicable screening as appropriate for cardiovascular disease, diabetes, osteopenia/osteoporosis, and glaucoma.  As appropriate for age/gender, discussed screening for colorectal cancer, prostate cancer, breast cancer, and cervical cancer. Checklist reviewing preventive services available has been given to the patient.    Reviewed patients plan of care and provided an AVS. The Basic Care Plan (routine screening as documented in Health Maintenance) for Duane meets the Care Plan requirement. This Care Plan has been established and reviewed with the Patient.          Bryant Weeks MD  [facility]    Identified Health Risks:

## 2023-01-30 NOTE — PATIENT INSTRUCTIONS
-- Continue aspirin    -- I'd recommend switching to Eliquis at age 73   -- Let me know if you want to meet with Cardiology     -- Avoid foods below for a while   -- Avoid extra vitamins for a while   -- Low salt   -- Reduce fluids after 5pm   -- Consider compression socks   -- PSA today   -- Consider Urology consult     -- Tetanus and shingles at the pharmacy     -- Plan for cologuard next year    Overactive Bladder: foods to consider avoiding/reducing  Certain foods and beverages might irritate your bladder, including:    Coffee, tea and carbonated drinks, even without caffeine  Alcohol  Certain acidic fruits -- oranges, grapefruits, cynthia and limes -- and fruit juices  Spicy foods  Tomato-based products  Carbonated drinks  Chocolate    Consider avoiding these possible bladder irritants for about a week to see if your symptoms improve. Then gradually -- every one to two days -- add one back into your diet, noting any changes in urinary urgency, frequency or incontinence.    (Source [facility]: [email or web address])        Patient Education   Personalized Prevention Plan  You are due for the preventive services outlined below.  Your care team is available to assist you in scheduling these services.  If you have already completed any of these items, please share that information with your care team to update in your medical record.  Health Maintenance Due   Topic Date Due    Diptheria Tetanus Pertussis (DTAP/TDAP/TD) Vaccine (1 - Tdap) Never done    Zoster (Shingles) Vaccine (2 of 2) 12/26/2022       Understanding USDA MyPlate  The USDA has guidelines to help you make healthy food choices. These are called MyPlate. MyPlate shows the food groups that make up healthy meals using the image of a place setting. Before you eat, think about the healthiest choices for what to put on your plate or in your cup or bowl. To learn  more about building a healthy plate, visit www.choosemyplate.gov.    The food groups  Fruits. Any fruit or 100% fruit juice counts as part of the Fruit Group. Fruits may be fresh, canned, frozen, or dried, and may be whole, cut-up, or pureed. Make 1/2 of your plate fruits and vegetables.  Vegetables. Any vegetable or 100% vegetable juice counts as a member of the Vegetable Group. Vegetables may be fresh, frozen, canned, or dried. They can be served raw or cooked and may be whole, cut-up, or mashed. Make 1/2 of your plate fruits and vegetables.  Grains. All foods made from grains are part of the Grains Group. These include wheat, rice, oats, cornmeal, and barley. Grains are often used to make foods such as bread, pasta, oatmeal, cereal, tortillas, and grits. Grains should be no more than 1/4 of your plate. At least half of your grains should be whole grains.  Protein. This group includes meat, poultry, seafood, beans and peas, eggs, processed soy products (such as tofu), nuts (including nut butters), and seeds. Make protein choices no more than 1/4 of your plate. Meat and poultry choices should be lean or low fat.  Dairy. The Dairy Group includes all fluid milk products and foods made from milk that contain calcium, such as yogurt and cheese. (Foods that have little calcium, such as cream, butter, and cream cheese, are not part of this group.) Most dairy choices should be low-fat or fat-free.  Oils. Oils aren't a food group, but they do contain essential nutrients. However it's important to watch your intake of oils. These are fats that are liquid at room temperature. They include canola, corn, olive, soybean, vegetable, and sunflower oil. Foods that are mainly oil include mayonnaise, certain salad dressings, and soft margarines. You likely already get your daily oil allowance from the foods you eat.  Things to limit  Eating healthy also means limiting these things in your diet:     Salt (sodium). Many processed  foods have a lot of sodium. To keep sodium intake down, eat fresh vegetables, meats, poultry, and seafood when possible. Purchase low-sodium, reduced-sodium, or no-salt-added food products at the store. And don't add salt to your meals at home. Instead, season them with herbs and spices such as dill, oregano, cumin, and paprika. Or try adding flavor with lemon or lime zest and juice.  Saturated fat. Saturated fats are most often found in animal products such as beef, pork, and chicken. They are often solid at room temperature, such as butter. To reduce your saturated fat intake, choose leaner cuts of meat and poultry. And try healthier cooking methods such as grilling, broiling, roasting, or baking. For a simple lower-fat swap, use plain nonfat yogurt instead of mayonnaise when making potato salad or macaroni salad.  Added sugars. These are sugars added to foods. They are in foods such as ice cream, candy, soda, fruit drinks, sports drinks, energy drinks, cookies, pastries, jams, and syrups. Cut down on added sugars by sharing sweet treats with a family member or friend. You can also choose fruit for dessert, and drink water or other unsweetened beverages.     [employer] last reviewed this educational content on 6/1/2020 2000-2021 [employer]. All rights reserved. This information is not intended as a substitute for professional medical care. Always follow your healthcare professional's instructions.

## 2023-01-30 NOTE — NURSING NOTE
Chief Complaint   Patient presents with     Medicare Visit     annual         Initial /70   Pulse 90   Temp 97.5  F (36.4  C) (Tympanic)   Resp 20   Ht 1.829 m (6')   Wt 93.7 kg (206 lb 8 oz)   SpO2 100%   BMI 28.01 kg/m   Estimated body mass index is 28.01 kg/m  as calculated from the following:    Height as of this encounter: 1.829 m (6').    Weight as of this encounter: 93.7 kg (206 lb 8 oz).         Norma J. Gosselin, LPN

## 2024-02-12 DIAGNOSIS — I48.3 TYPICAL ATRIAL FLUTTER (H): ICD-10-CM

## 2024-02-12 DIAGNOSIS — E78.2 MIXED HYPERLIPIDEMIA: ICD-10-CM

## 2024-02-15 ENCOUNTER — OFFICE VISIT (OUTPATIENT)
Dept: PEDIATRICS | Facility: OTHER | Age: 74
End: 2024-02-15
Attending: INTERNAL MEDICINE
Payer: COMMERCIAL

## 2024-02-15 VITALS
WEIGHT: 211.7 LBS | HEART RATE: 106 BPM | SYSTOLIC BLOOD PRESSURE: 130 MMHG | HEIGHT: 72 IN | DIASTOLIC BLOOD PRESSURE: 80 MMHG | BODY MASS INDEX: 28.68 KG/M2 | RESPIRATION RATE: 16 BRPM | TEMPERATURE: 97.9 F | OXYGEN SATURATION: 100 %

## 2024-02-15 DIAGNOSIS — Z12.11 COLON CANCER SCREENING: ICD-10-CM

## 2024-02-15 DIAGNOSIS — Z00.00 ENCOUNTER FOR MEDICARE ANNUAL WELLNESS EXAM: Primary | ICD-10-CM

## 2024-02-15 DIAGNOSIS — I87.8 VENOUS STASIS OF BOTH LOWER EXTREMITIES: ICD-10-CM

## 2024-02-15 DIAGNOSIS — I48.91 ATRIAL FIBRILLATION, UNSPECIFIED TYPE (H): ICD-10-CM

## 2024-02-15 DIAGNOSIS — Z12.5 SCREENING FOR PROSTATE CANCER: ICD-10-CM

## 2024-02-15 DIAGNOSIS — L50.8 CHRONIC URTICARIA: ICD-10-CM

## 2024-02-15 DIAGNOSIS — R06.83 SNORING: ICD-10-CM

## 2024-02-15 DIAGNOSIS — E66.3 OVERWEIGHT: ICD-10-CM

## 2024-02-15 DIAGNOSIS — E78.2 MIXED HYPERLIPIDEMIA: ICD-10-CM

## 2024-02-15 DIAGNOSIS — I48.3 TYPICAL ATRIAL FLUTTER (H): ICD-10-CM

## 2024-02-15 DIAGNOSIS — R73.09 ELEVATED GLUCOSE LEVEL: ICD-10-CM

## 2024-02-15 LAB
ANION GAP SERPL CALCULATED.3IONS-SCNC: 7 MMOL/L (ref 7–15)
BUN SERPL-MCNC: 16.5 MG/DL (ref 8–23)
CALCIUM SERPL-MCNC: 9.8 MG/DL (ref 8.8–10.2)
CHLORIDE SERPL-SCNC: 105 MMOL/L (ref 98–107)
CHOLEST SERPL-MCNC: 179 MG/DL
CREAT SERPL-MCNC: 1.09 MG/DL (ref 0.67–1.17)
DEPRECATED HCO3 PLAS-SCNC: 28 MMOL/L (ref 22–29)
EGFRCR SERPLBLD CKD-EPI 2021: 71 ML/MIN/1.73M2
ERYTHROCYTE [DISTWIDTH] IN BLOOD BY AUTOMATED COUNT: 13.6 % (ref 10–15)
FASTING STATUS PATIENT QL REPORTED: NO
GLUCOSE SERPL-MCNC: 82 MG/DL (ref 70–99)
HBA1C MFR BLD: 5.7 % (ref 4–6.2)
HCT VFR BLD AUTO: 39.2 % (ref 40–53)
HDLC SERPL-MCNC: 49 MG/DL
HGB BLD-MCNC: 13.4 G/DL (ref 13.3–17.7)
LDLC SERPL CALC-MCNC: 92 MG/DL
MCH RBC QN AUTO: 30.2 PG (ref 26.5–33)
MCHC RBC AUTO-ENTMCNC: 34.2 G/DL (ref 31.5–36.5)
MCV RBC AUTO: 89 FL (ref 78–100)
NONHDLC SERPL-MCNC: 130 MG/DL
PLATELET # BLD AUTO: 322 10E3/UL (ref 150–450)
POTASSIUM SERPL-SCNC: 4.1 MMOL/L (ref 3.4–5.3)
PSA SERPL DL<=0.01 NG/ML-MCNC: 0.83 NG/ML (ref 0–6.5)
RBC # BLD AUTO: 4.43 10E6/UL (ref 4.4–5.9)
SODIUM SERPL-SCNC: 140 MMOL/L (ref 135–145)
TRIGL SERPL-MCNC: 189 MG/DL
TSH SERPL DL<=0.005 MIU/L-ACNC: 2.44 UIU/ML (ref 0.3–4.2)
WBC # BLD AUTO: 8.4 10E3/UL (ref 4–11)

## 2024-02-15 PROCEDURE — 83036 HEMOGLOBIN GLYCOSYLATED A1C: CPT | Mod: ZL | Performed by: INTERNAL MEDICINE

## 2024-02-15 PROCEDURE — 80061 LIPID PANEL: CPT | Mod: ZL | Performed by: INTERNAL MEDICINE

## 2024-02-15 PROCEDURE — 85041 AUTOMATED RBC COUNT: CPT | Mod: ZL | Performed by: INTERNAL MEDICINE

## 2024-02-15 PROCEDURE — 99214 OFFICE O/P EST MOD 30 MIN: CPT | Mod: 25 | Performed by: INTERNAL MEDICINE

## 2024-02-15 PROCEDURE — G0463 HOSPITAL OUTPT CLINIC VISIT: HCPCS

## 2024-02-15 PROCEDURE — 84443 ASSAY THYROID STIM HORMONE: CPT | Mod: ZL | Performed by: INTERNAL MEDICINE

## 2024-02-15 PROCEDURE — G0439 PPPS, SUBSEQ VISIT: HCPCS | Performed by: INTERNAL MEDICINE

## 2024-02-15 PROCEDURE — G0103 PSA SCREENING: HCPCS | Mod: ZL | Performed by: INTERNAL MEDICINE

## 2024-02-15 PROCEDURE — 80048 BASIC METABOLIC PNL TOTAL CA: CPT | Mod: ZL | Performed by: INTERNAL MEDICINE

## 2024-02-15 PROCEDURE — 36415 COLL VENOUS BLD VENIPUNCTURE: CPT | Mod: ZL | Performed by: INTERNAL MEDICINE

## 2024-02-15 RX ORDER — FAMOTIDINE 20 MG/1
20 TABLET, FILM COATED ORAL
Qty: 90 TABLET | Refills: 4 | Status: SHIPPED | OUTPATIENT
Start: 2024-02-15 | End: 2024-05-06

## 2024-02-15 RX ORDER — CETIRIZINE HYDROCHLORIDE 10 MG/1
10 TABLET ORAL DAILY
Qty: 90 TABLET | Refills: 4 | Status: SHIPPED | OUTPATIENT
Start: 2024-02-15

## 2024-02-15 RX ORDER — ATORVASTATIN CALCIUM 10 MG/1
10 TABLET, FILM COATED ORAL DAILY
Qty: 90 TABLET | Refills: 0 | OUTPATIENT
Start: 2024-02-15

## 2024-02-15 RX ORDER — METOPROLOL TARTRATE 25 MG/1
25 TABLET, FILM COATED ORAL 2 TIMES DAILY
Qty: 180 TABLET | Refills: 3 | Status: CANCELLED | OUTPATIENT
Start: 2024-02-15

## 2024-02-15 RX ORDER — METOPROLOL TARTRATE 50 MG
50 TABLET ORAL 2 TIMES DAILY
Qty: 180 TABLET | Refills: 3 | Status: SHIPPED | OUTPATIENT
Start: 2024-02-15

## 2024-02-15 RX ORDER — ATORVASTATIN CALCIUM 10 MG/1
10 TABLET, FILM COATED ORAL DAILY
Qty: 90 TABLET | Refills: 4 | Status: SHIPPED | OUTPATIENT
Start: 2024-02-15

## 2024-02-15 RX ORDER — METOPROLOL TARTRATE 25 MG/1
TABLET, FILM COATED ORAL
Qty: 180 TABLET | Refills: 0 | OUTPATIENT
Start: 2024-02-15

## 2024-02-15 SDOH — HEALTH STABILITY: PHYSICAL HEALTH: ON AVERAGE, HOW MANY DAYS PER WEEK DO YOU ENGAGE IN MODERATE TO STRENUOUS EXERCISE (LIKE A BRISK WALK)?: 3 DAYS

## 2024-02-15 ASSESSMENT — SOCIAL DETERMINANTS OF HEALTH (SDOH): HOW OFTEN DO YOU GET TOGETHER WITH FRIENDS OR RELATIVES?: ONCE A WEEK

## 2024-02-15 ASSESSMENT — PAIN SCALES - GENERAL: PAINLEVEL: NO PAIN (0)

## 2024-02-15 NOTE — NURSING NOTE
Chief Complaint   Patient presents with    Medicare Visit    Recheck Medication       Initial /80   Pulse 106   Temp 97.9  F (36.6  C) (Tympanic)   Resp 16   Ht 1.829 m (6')   Wt 96 kg (211 lb 11.2 oz)   SpO2 100%   BMI 28.71 kg/m   Estimated body mass index is 28.71 kg/m  as calculated from the following:    Height as of this encounter: 1.829 m (6').    Weight as of this encounter: 96 kg (211 lb 11.2 oz).  Medication Review: complete    The next two questions are to help us understand your food security.  If you are feeling you need any assistance in this area, we have resources available to support you today.          2/15/2024   SDOH- Food Insecurity   Within the past 12 months, did you worry that your food would run out before you got money to buy more? N   Within the past 12 months, did the food you bought just not last and you didn t have money to get more? N         Health Care Directive:  Patient does not have a Health Care Directive or Living Will: Discussed advance care planning with patient; however, patient declined at this time.    Norma J. Gosselin, LPN

## 2024-02-15 NOTE — PATIENT INSTRUCTIONS
Possible Sleep Apnea:  Link to ST[facility]-Bang Flowsheet :[id number]}      2/15/2024     3:13 PM   [facility]ang Total Score   Total Score 6   Risk Stratification 5 - 8: High Risk for ARRON      -- Sleep study   -- Sleep consult with Dr. Meneses     -- Increase metoprolol to 50 mg twice daily   -- Schedule echocardiogram   -- Stop aspirin   -- Start Eliquis   -- Follow-up with Dr. Weeks in 6 weeks for pulse, blood pressure, review echo     -- Tdap and RSV at pharmacy     -- Low salt diet   -- Reduce fluid   -- Eat healthy protein   -- Learn about DASH Diet for dietary ways to reduce blood pressure  Google: [facility] DASH diet  [facility] site: https://www.nhlbi.nih.gov/health-topics/dash-eating-plan  PDF from [facility]: https://www.nhlbi.nih.gov/files/docs/public/heart/new_dash.pdf   -- Elevate feet above your hips for 20-30 minutes, 4 times per day   -- Compression stockings from morning to night      Overactive Bladder: foods to consider avoiding/reducing  Certain foods and beverages might irritate your bladder, including:    Coffee, tea and carbonated drinks, even without caffeine  Alcohol  Certain acidic fruits -- oranges, grapefruits, cynthia and limes -- and fruit juices  Spicy foods  Tomato-based products  Carbonated drinks  Chocolate    Consider avoiding these possible bladder irritants for about a week to see if your symptoms improve. Then gradually -- every one to two days -- add one back into your diet, noting any changes in urinary urgency, frequency or incontinence.    (Source [facility]: [email or web address]-[email or web address]-[email or web address]-[email or web address]-control-[email or web address]?p=1)      Patient Education     Preventive Care Advice   This is general advice given by our system to help you stay healthy. However, your care team may have specific advice just for you. Please talk to your care team about your preventive care needs.  Nutrition  Eat 5 or more servings of fruits and vegetables each day.  Try wheat bread,  brown rice and whole grain pasta (instead of white bread, rice, and pasta).  Get enough calcium and vitamin D. Check the label on foods and aim for 100% of the RDA (recommended daily allowance).  Lifestyle  Exercise at least 150 minutes each week  (30 minutes a day, 5 days a week).  Do muscle strengthening activities 2 days a week. These help control your weight and prevent disease.  No smoking.  Wear sunscreen to prevent skin cancer.  Have a dental exam and cleaning every 6 months.  Yearly exams  See your health care team every year to talk about:  Any changes in your health.  Any medicines your care team has prescribed.  Preventive care, family planning, and ways to prevent chronic diseases.  Shots (vaccines)   HPV shots (up to age 26), if you've never had them before.  Hepatitis B shots (up to age 59), if you've never had them before.  COVID-19 shot: Get this shot when it's due.  Flu shot: Get a flu shot every year.  Tetanus shot: Get a tetanus shot every 10 years.  Pneumococcal, hepatitis A, and RSV shots: Ask your care team if you need these based on your risk.  Shingles shot (for age 50 and up)  General health tests  Diabetes screening:  Starting at age 35, Get screened for diabetes at least every 3 years.  If you are younger than age 35, ask your care team if you should be screened for diabetes.  Cholesterol test: At age 39, start having a cholesterol test every 5 years, or more often if advised.  Bone density scan (DEXA): At age 50, ask your care team if you should have this scan for osteoporosis (brittle bones).  Hepatitis C: Get tested at least once in your life.  STIs (sexually transmitted infections)  Before age 24: Ask your care team if you should be screened for STIs.  After age 24: Get screened for STIs if you're at risk. You are at risk for STIs (including HIV) if:  You are sexually active with more than one person.  You don't use condoms every time.  You or a partner was diagnosed with a sexually  transmitted infection.  If you are at risk for HIV, ask about PrEP medicine to prevent HIV.  Get tested for HIV at least once in your life, whether you are at risk for HIV or not.  Cancer screening tests  Cervical cancer screening: If you have a cervix, begin getting regular cervical cancer screening tests starting at age 21.  Breast cancer scan (mammogram): If you've ever had breasts, begin having regular mammograms starting at age 40. This is a scan to check for breast cancer.  Colon cancer screening: It is important to start screening for colon cancer at age 45.  Have a colonoscopy test every 10 years (or more often if you're at risk) Or, ask your provider about stool tests like a FIT test every year or Cologuard test every 3 years.  To learn more about your testing options, visit:   [email or web address].pdf.  For help making a decision, visit:   [email or web address].  Prostate cancer screening test: If you have a prostate, ask your care team if a prostate cancer screening test (PSA) at age 55 is right for you.  Lung cancer screening: If you are a current or former smoker ages 50 to 80, ask your care team if ongoing lung cancer screenings are right for you.  For informational purposes only. Not to replace the advice of your health care provider. Copyright   2023 [facility]. All rights reserved. Clinically reviewed by the [facility] Transitions Program. DITTO.com 881861 - REV 01/24.    Learning About Activities of Daily Living  What are activities of daily living?     Activities of daily living (ADLs) are the basic self-care tasks you do every day. As you age, and if you have health problems, you may find that it's harder to do these things for yourself. That's when you may need some help.  Your doctor uses ADLs to measure how much help you need. Knowing what you can and can't do for yourself is an important first step to getting help. And when you have the help you need, you can  stay as independent as possible.  Your doctor will want to know if you are able to do tasks such as:  Take a bath or shower without help.  Go to the bathroom by yourself.  Dress and undress without help.  Shave, comb your hair, and brush teeth on your own.  Get in and out of bed or a chair without help.  Feed yourself without help.  If you are having trouble doing basic self-care tasks, talk with your doctor. You may want to bring a caregiver or family member who can help the doctor understand your needs and abilities.  How will a doctor assess your ADLs?  Asking about ADLs is part of a routine health checkup your doctor will likely do as you age. Your health check might be done in a doctor's office, in your home, or at a hospital. The goal is to find out if you are having any problems that could make your health problems worse or that make it unsafe for you to be on your own.  To measure your ADLs, your doctor will ask how hard it is for you to do routine tasks. He or she may also want to know if you have changed the way you do a task because of a health problem. He or she may watch how you:  Walk back and forth.  Keep your balance while you stand or walk.  Move from sitting to standing or from a bed to a chair.  Button or unbutton a shirt or sweater.  Remove and put on your shoes.  It's normal to feel a little worried or anxious if you find you can't do all the things you used to be able to do. Talking with your doctor about ADLs isn't a test that you either pass or fail. It's just a way to get more information about your health and safety.  Follow-up care is a key part of your treatment and safety. Be sure to make and go to all appointments, and call your doctor if you are having problems. It's also a good idea to know your test results and keep a list of the medicines you take.  Current as of: February 26, 2023               Content Version: 13.8    4493-4790 [employer].   Care instructions adapted  under license by your healthcare professional. If you have questions about a medical condition or this instruction, always ask your healthcare professional. Healthwise, [facility] disclaims any warranty or liability for your use of this information.      Learning About Stress  What is stress?     Stress is your body's response to a hard situation. Your body can have a physical, emotional, or mental response. Stress is a fact of life for most people, and it affects everyone differently. What causes stress for you may not be stressful for someone else.  A lot of things can cause stress. You may feel stress when you go on a job interview, take a test, or run a race. This kind of short-term stress is normal and even useful. It can help you if you need to work hard or react quickly. For example, stress can help you finish an important job on time.  Long-term stress is caused by ongoing stressful situations or events. Examples of long-term stress include long-term health problems, ongoing problems at work, or conflicts in your family. Long-term stress can harm your health.  How does stress affect your health?  When you are stressed, your body responds as though you are in danger. It makes hormones that speed up your heart, make you breathe faster, and give you a burst of energy. This is called the fight-or-flight stress response. If the stress is over quickly, your body goes back to normal and no harm is done.  But if stress happens too often or lasts too long, it can have bad effects. Long-term stress can make you more likely to get sick, and it can make symptoms of some diseases worse. If you tense up when you are stressed, you may develop neck, shoulder, or low back pain. Stress is linked to high blood pressure and heart disease.  Stress also harms your emotional health. It can make you mao, tense, or depressed. Your relationships may suffer, and you may not do well at work or school.  What can you do to manage  stress?  You can try these things to help manage stress:   Do something active. Exercise or activity can help reduce stress. Walking is a great way to get started. Even everyday activities such as housecleaning or yard work can help.  Try yoga or lay chi. These techniques combine exercise and meditation. You may need some training at first to learn them.  Do something you enjoy. For example, listen to music or go to a movie. Practice your hobby or do volunteer work.  Meditate. This can help you relax, because you are not worrying about what happened before or what may happen in the future.  Do guided imagery. Imagine yourself in any setting that helps you feel calm. You can use online videos, books, or a teacher to guide you.  Do breathing exercises. For example:  From a standing position, bend forward from the waist with your knees slightly bent. Let your arms dangle close to the floor.  Breathe in slowly and deeply as you return to a standing position. Roll up slowly and lift your head last.  Hold your breath for just a few seconds in the standing position.  Breathe out slowly and bend forward from the waist.  Let your feelings out. Talk, laugh, cry, and express anger when you need to. Talking with supportive friends or family, a counselor, or a ines leader about your feelings is a healthy way to relieve stress. Avoid discussing your feelings with people who make you feel worse.  Write. It may help to write about things that are bothering you. This helps you find out how much stress you feel and what is causing it. When you know this, you can find better ways to cope.  What can you do to prevent stress?  You might try some of these things to help prevent stress:  Manage your time. This helps you find time to do the things you want and need to do.  Get enough sleep. Your body recovers from the stresses of the day while you are sleeping.  Get support. Your family, friends, and community can make a difference in how  "you experience stress.  Limit your news feed. Avoid or limit time on social media or news that may make you feel stressed.  Do something active. Exercise or activity can help reduce stress. Walking is a great way to get started.  Where can you learn more?  Go to https://www.[employer].net/patiented  Enter N032 in the search box to learn more about \"Learning About Stress.\"  Current as of: February 26, 2023               Content Version: 13.8    5087-7073 [employer].   Care instructions adapted under license by your healthcare professional. If you have questions about a medical condition or this instruction, always ask your healthcare professional. [employer] disclaims any warranty or liability for your use of this information.      Learning About Sleeping Well  What does sleeping well mean?     Sleeping well means getting enough sleep to feel good and stay healthy. How much sleep is enough varies among people.  The number of hours you sleep and how you feel when you wake up are both important. If you do not feel refreshed, you probably need more sleep. Another sign of not getting enough sleep is feeling tired during the day.  Experts recommend that adults get at least 7 or more hours of sleep per day. Children and older adults need more sleep.  Why is getting enough sleep important?  Getting enough quality sleep is a basic part of good health. When your sleep suffers, your physical health, mood, and your thoughts can suffer too. You may find yourself feeling more grumpy or stressed. Not getting enough sleep also can lead to serious problems, including injury, accidents, anxiety, and depression.  What might cause poor sleeping?  Many things can cause sleep problems, including:  Changes to your sleep schedule.  Stress. Stress can be caused by fear about a single event, such as giving a speech. Or you may have ongoing stress, such as worry about work or school.  Depression, anxiety, and other " "mental or emotional conditions.  Changes in your sleep habits or surroundings. This includes changes that happen where you sleep, such as noise, light, or sleeping in a different bed. It also includes changes in your sleep pattern, such as having jet lag or working a late shift.  Health problems, such as pain, breathing problems, and restless legs syndrome.  Lack of regular exercise.  Using alcohol, nicotine, or caffeine before bed.  How can you help yourself?  Here are some tips that may help you sleep more soundly and wake up feeling more refreshed.  Your sleeping area   Use your bedroom only for sleeping and sex. A bit of light reading may help you fall asleep. But if it doesn't, do your reading elsewhere in the house. Try not to use your TV, computer, smartphone, or tablet while you are in bed.  Be sure your bed is big enough to stretch out comfortably, especially if you have a sleep partner.  Keep your bedroom quiet, dark, and cool. Use curtains, blinds, or a sleep mask to block out light. To block out noise, use earplugs, soothing music, or a \"white noise\" machine.  Your evening and bedtime routine   Create a relaxing bedtime routine. You might want to take a warm shower or bath, or listen to soothing music.  Go to bed at the same time every night. And get up at the same time every morning, even if you feel tired.  What to avoid   Limit caffeine (coffee, tea, caffeinated sodas) during the day, and don't have any for at least 6 hours before bedtime.  Avoid drinking alcohol before bedtime. Alcohol can cause you to wake up more often during the night.  Try not to smoke or use tobacco, especially in the evening. Nicotine can keep you awake.  Limit naps during the day, especially close to bedtime.  Avoid lying in bed awake for too long. If you can't fall asleep or if you wake up in the middle of the night and can't get back to sleep within about 20 minutes, get out of bed and go to another room until you feel " "sleepy.  Avoid taking medicine right before bed that may keep you awake or make you feel hyper or energized. Your doctor can tell you if your medicine may do this and if you can take it earlier in the day.  If you can't sleep   Imagine yourself in a peaceful, pleasant scene. Focus on the details and feelings of being in a place that is relaxing.  Get up and do a quiet or boring activity until you feel sleepy.  Avoid drinking any liquids before going to bed to help prevent waking up often to use the bathroom.  Where can you learn more?  Go to https://www.[employer].net/patiented  Enter J942 in the search box to learn more about \"Learning About Sleeping Well.\"  Current as of: July 11, 2023               Content Version: 13.8    4840-7521 [employer].   Care instructions adapted under license by your healthcare professional. If you have questions about a medical condition or this instruction, always ask your healthcare professional. [employer] disclaims any warranty or liability for your use of this information.      Bladder Training: Care Instructions  Your Care Instructions     Bladder training is used to treat urge incontinence and stress incontinence. Urge incontinence means that the need to urinate comes on so fast that you can't get to a toilet in time. Stress incontinence means that you leak urine because of pressure on your bladder. For example, it may happen when you laugh, cough, or lift something heavy.  Bladder training can increase how long you can wait before you have to urinate. It can also help your bladder hold more urine. And it can give you better control over the urge to urinate.  It is important to remember that bladder training takes a few weeks to a few months to make a difference. You may not see results right away, but don't give up.  Follow-up care is a key part of your treatment and safety. Be sure to make and go to all appointments, and call your doctor if you are " having problems. It's also a good idea to know your test results and keep a list of the medicines you take.  How can you care for yourself at home?  Work with your doctor to come up with a bladder training program that is right for you. You may use one or more of the following methods.  Delayed urination  In the beginning, try to keep from urinating for 5 minutes after you first feel the need to go.  While you wait, take deep, slow breaths to relax. Kegel exercises can also help you delay the need to go to the bathroom.  After some practice, when you can easily wait 5 minutes to urinate, try to wait 10 minutes before you urinate.  Slowly increase the waiting period until you are able to control when you have to urinate.  Scheduled urination  Empty your bladder when you first wake up in the morning.  Schedule times throughout the day when you will urinate.  Start by going to the bathroom every hour, even if you don't need to go.  Slowly increase the time between trips to the bathroom.  When you have found a schedule that works well for you, keep doing it.  If you wake up during the night and have to urinate, do it. Apply your schedule to waking hours only.  Kegel exercises  These tighten and strengthen pelvic muscles, which can help you control the flow of urine. (If doing these exercises causes pain, stop doing them and talk with your doctor.) To do Kegel exercises:  Squeeze your muscles as if you were trying not to pass gas. Or squeeze your muscles as if you were stopping the flow of urine. Your belly, legs, and buttocks shouldn't move.  Hold the squeeze for 3 seconds, then relax for 5 to 10 seconds.  Start with 3 seconds, then add 1 second each week until you are able to squeeze for 10 seconds.  Repeat the exercise 10 times a session. Do 3 to 8 sessions a day.  When should you call for help?  Watch closely for changes in your health, and be sure to contact your doctor if:    Your incontinence is getting worse.      "You do not get better as expected.   Where can you learn more?  Go to https://www.[employer].net/patiented  Enter V684 in the search box to learn more about \"Bladder Training: Care Instructions.\"  Current as of: February 28, 2023               Content Version: 13.8    9525-5873 [employer].   Care instructions adapted under license by your healthcare professional. If you have questions about a medical condition or this instruction, always ask your healthcare professional. Healthwise, [employer] disclaims any warranty or liability for your use of this information.      "

## 2024-02-15 NOTE — PROGRESS NOTES
Preventive Care Visit  [facility] AND [facility]  Bryant Weeks MD, Internal Medicine  Feb 15, 2024    1. Encounter for Medicare annual wellness exam    2. Colon cancer screening  Discussed colon cancer screening and decided to proceed with Cologuard.  - COLOGUARD(EXACT SCIENCES); Future    3. Screening for prostate cancer  Discussed prostate cancer screening and decided to obtain PSA  - Prostate Specific Antigen Screen; Future  - Prostate Specific Antigen Screen    4. Mixed hyperlipidemia  At goal, no change.  Continue Lipitor, due for recheck lipid panel  - atorvastatin (LIPITOR) 10 MG tablet; Take 1 tablet (10 mg) by mouth daily  Dispense: 90 tablet; Refill: 4  - Lipid Profile; Future  - Lipid Profile    5. Typical atrial flutter (H)  We had a lengthy discussion today with regards to atrial flutter/fibrillation.  Now that he turns 73 years old his [id number] score has increased to 2.  This would increase his risk for stroke and it is recommended to initiate anticoagulation.  We had a lengthy discussion today with regard to anticoagulation including possible options, possible complications, etc.  We decided to start Eliquis.  Unclear to me why we did not obtain echocardiogram in the past and so I do recommend obtaining that at this time looking for structural heart disease.  Not currently rate controlled.  Recommend increasing metoprolol.  Close follow-up recommended.  - metoprolol tartrate (LOPRESSOR) 50 MG tablet; Take 1 tablet (50 mg) by mouth 2 times daily  Dispense: 180 tablet; Refill: 3  - Echocardiogram Complete; Future  - apixaban ANTICOAGULANT (ELIQUIS ANTICOAGULANT) 5 MG tablet; Take 1 tablet (5 mg) by mouth 2 times daily  Dispense: 180 tablet; Refill: 4  - CBC with platelets; Future  - TSH with free T4 reflex; Future  - CBC with platelets  - TSH with free T4 reflex    6. Chronic urticaria  Follows with allergist  - cetirizine (ZYRTEC) 10 MG tablet; Take 1 tablet (10 mg) by mouth daily   Dispense: 90 tablet; Refill: 4  - famotidine (PEPCID) 20 MG tablet; Take 1 tablet (20 mg) by mouth daily at 2 pm  Dispense: 90 tablet; Refill: 4    7. Snoring  Concerned about sleep apnea given atrial fibrillation, snoring and elevated STOP-BANG.  Recommend sleep study and sleep consult.  - Adult Sleep Eval & Management  Referral; Future  - POLYSOMNOGRAPHY W/CPAP; Future    8. Elevated glucose level  - Basic metabolic panel; Future  - Hemoglobin A1c; Future  - Basic metabolic panel  - Hemoglobin A1c    9. Atrial fibrillation, unspecified type (H)  - TSH with free T4 reflex; Future  - TSH with free T4 reflex    10. Overweight    11. Venous stasis of both lower extremities  I recommend compression stockings and dietary changes as outlined below.      Patient Instructions   Possible Sleep Apnea:  Link to STOP-Bang Flowsheet :[id number]}      2/15/2024     3:13 PM   STOP-Bang Total Score   Total Score 6   Risk Stratification 5 - 8: High Risk for ARRON      -- Sleep study   -- Sleep consult with Dr. Meneses     -- Increase metoprolol to 50 mg twice daily   -- Schedule echocardiogram   -- Stop aspirin   -- Start Eliquis   -- Follow-up with Dr. Weeks in 6 weeks for pulse, blood pressure, review echo     -- Tdap and RSV at pharmacy     -- Low salt diet   -- Reduce fluid   -- Eat healthy protein   -- Learn about DASH Diet for dietary ways to reduce blood pressure  Google: NIH DASH diet  NIH site: https://www.nhlbi.nih.gov/health-topics/dash-eating-plan  PDF from [facility]: https://www.nhlbi.nih.gov/files/docs/public/heart/new_dash.pdf   -- Elevate feet above your hips for 20-30 minutes, 4 times per day   -- Compression stockings from morning to night      Overactive Bladder: foods to consider avoiding/reducing  Certain foods and beverages might irritate your bladder, including:    Coffee, tea and carbonated drinks, even without caffeine  Alcohol  Certain acidic fruits -- oranges, grapefruits, cynthia and limes -- and fruit  juices  Spicy foods  Tomato-based products  Carbonated drinks  Chocolate    Consider avoiding these possible bladder irritants for about a week to see if your symptoms improve. Then gradually -- every one to two days -- add one back into your diet, noting any changes in urinary urgency, frequency or incontinence.    (Source [facility]: https://www.[facility].org/diseases-conditions/urinary-incontinence/in-depth/bladder-control-problem/ART-17757318?p=1)      Signed, Bryant Weeks MD, FAAP, FACP  Internal Medicine & Pediatrics          Subjective   Duane is a 73 year old, presenting for the following:  Medicare Visit and Recheck Medication          Health Care Directive  Patient does not have a Health Care Directive or Living Will: Discussed advance care planning with patient; however, patient declined at this time.    HPI              2/15/2024   General Health   How would you rate your overall physical health? Good   Feel stress (tense, anxious, or unable to sleep) Only a little   (!) STRESS CONCERN      2/15/2024   Nutrition   Diet: Regular (no restrictions)         2/15/2024   Exercise   Days per week of moderate/strenous exercise 3 days         2/15/2024   Social Factors   Frequency of gathering with friends or relatives Once a week   Worry food won't last until get money to buy more No   Food not last or not have enough money for food? No   Do you have housing?  Yes   Are you worried about losing your housing? No   Lack of transportation? No   Unable to get utilities (heat,electricity)? No         2/15/2024   Fall Risk   Fallen 2 or more times in the past year? No    No   Trouble with walking or balance? No    No          2/15/2024   Activities of Daily Living- Home Safety   Needs help with the following daily activites None of the above   Safety concerns in the home No grab bars in the bathroom         2/15/2024   Dental   Dentist two times every year? Yes         2/15/2024   Hearing Screening   Hearing  concerns? None of the above         2/15/2024   Driving Risk Screening   Patient/family members have concerns about driving No         2/15/2024   General Alertness/Fatigue Screening   Have you been more tired than usual lately? (!) YES         2/15/2024   Urinary Incontinence Screening   Bothered by leaking urine in past 6 months Yes          No data to display                  Today's PHQ-2 Score:       2/15/2024     2:56 PM   PHQ-2 ( 1999 [employer])   Q1: Little interest or pleasure in doing things 0   Q2: Feeling down, depressed or hopeless 0   PHQ-2 Score 0   Q1: Little interest or pleasure in doing things Not at all   Q2: Feeling down, depressed or hopeless Not at all   PHQ-2 Score 0           2/15/2024   Substance Use   Alcohol more than 3/day or more than 7/wk No   Do you have a current opioid prescription? No   How severe/bad is pain from 1 to 10? 0/10 (No Pain)   Do you use any other substances recreationally? No     Social History     Tobacco Use    Smoking status: Never    Smokeless tobacco: Never   Vaping Use    Vaping Use: Never used   Substance Use Topics    Alcohol use: Not Currently     Alcohol/week: 2.5 standard drinks of alcohol     Comment: rare    Drug use: Never           2/15/2024   AAA Screening   Family history of Abdominal Aortic Aneurysm (AAA)? No   The 10-year ASCVD risk score (Radha DK, et al., 2019) is: 24.7%    Values used to calculate the score:      Age: 73 years      Sex: Male      Is Non- : No      Diabetic: No      Tobacco smoker: No      Systolic Blood Pressure: 130 mmHg      Is BP treated: No      HDL Cholesterol: 49 mg/dL      Total Cholesterol: 179 mg/dL            Reviewed and updated as needed this visit by Provider   Tobacco  Allergies  Meds  Problems  Med Hx  Surg Hx  Fam Hx              Current providers sharing in care for this patient include:  Patient Care Team:  Bryant Weeks MD as PCP - General (Pediatrics)  Bryant Weeks  MD Jakub as Assigned PCP    The following health maintenance items are reviewed in Epic and correct as of today:  Health Maintenance   Topic Date Due    DTAP/TDAP/TD IMMUNIZATION (1 - Tdap) Never done    RSV VACCINE (Pregnancy & 60+) (1 - 1-dose 60+ series) Never done    COLORECTAL CANCER SCREENING  03/05/2024    MEDICARE ANNUAL WELLNESS VISIT  02/15/2025    LIPID  02/15/2025    FALL RISK ASSESSMENT  02/15/2025    GLUCOSE  02/15/2027    ADVANCE CARE PLANNING  01/30/2028    HEPATITIS C SCREENING  Completed    PHQ-2 (once per calendar year)  Completed    INFLUENZA VACCINE  Completed    Pneumococcal Vaccine: 65+ Years  Completed    ZOSTER IMMUNIZATION  Completed    COVID-19 Vaccine  Completed    IPV IMMUNIZATION  Aged Out    HPV IMMUNIZATION  Aged Out    MENINGITIS IMMUNIZATION  Aged Out    RSV MONOCLONAL ANTIBODY  Aged Out            Objective    Exam  /80   Pulse 106   Temp 97.9  F (36.6  C) (Tympanic)   Resp 16   Ht 1.829 m (6')   Wt 96 kg (211 lb 11.2 oz)   SpO2 100%   BMI 28.71 kg/m     Estimated body mass index is 28.71 kg/m  as calculated from the following:    Height as of this encounter: 1.829 m (6').    Weight as of this encounter: 96 kg (211 lb 11.2 oz).    Physical Exam  Gen: Alert, NAD.  Neck: No carotid bruits  CV: Irregularly irregular no m/r/g  Pulm: CTAB, no w/r/r. No increased work of breathing  Msk: No LE edema  Neuro: Grossly intact  Skin: No concerning lesions.  Psychiatric: Normal affect and insight. Does not appear anxious or depressed.          2/15/2024   Mini Cog   Clock Draw Score 2 Normal   3 Item Recall 3 objects recalled   Mini Cog Total Score 5            Signed Electronically by: Bryant Weeks MD

## 2024-02-16 ENCOUNTER — MYC MEDICAL ADVICE (OUTPATIENT)
Dept: PULMONOLOGY | Facility: OTHER | Age: 74
End: 2024-02-16

## 2024-02-22 ENCOUNTER — ORDERS ONLY (AUTO-RELEASED) (OUTPATIENT)
Dept: PEDIATRICS | Facility: OTHER | Age: 74
End: 2024-02-22
Payer: COMMERCIAL

## 2024-02-22 DIAGNOSIS — Z12.11 COLON CANCER SCREENING: ICD-10-CM

## 2024-02-23 ASSESSMENT — SLEEP AND FATIGUE QUESTIONNAIRES
HOW LIKELY ARE YOU TO NOD OFF OR FALL ASLEEP WHILE SITTING QUIETLY AFTER LUNCH WITHOUT ALCOHOL: WOULD NEVER DOZE
HOW LIKELY ARE YOU TO NOD OFF OR FALL ASLEEP IN A CAR, WHILE STOPPED FOR A FEW MINUTES IN TRAFFIC: SLIGHT CHANCE OF DOZING
HOW LIKELY ARE YOU TO NOD OFF OR FALL ASLEEP WHEN YOU ARE A PASSENGER IN A CAR FOR AN HOUR WITHOUT A BREAK: WOULD NEVER DOZE
HOW LIKELY ARE YOU TO NOD OFF OR FALL ASLEEP WHILE SITTING INACTIVE IN A PUBLIC PLACE: WOULD NEVER DOZE
HOW LIKELY ARE YOU TO NOD OFF OR FALL ASLEEP WHILE SITTING AND READING: SLIGHT CHANCE OF DOZING
HOW LIKELY ARE YOU TO NOD OFF OR FALL ASLEEP WHILE SITTING AND TALKING TO SOMEONE: WOULD NEVER DOZE
HOW LIKELY ARE YOU TO NOD OFF OR FALL ASLEEP WHILE LYING DOWN TO REST IN THE AFTERNOON WHEN CIRCUMSTANCES PERMIT: WOULD NEVER DOZE
HOW LIKELY ARE YOU TO NOD OFF OR FALL ASLEEP WHILE WATCHING TV: MODERATE CHANCE OF DOZING

## 2024-02-26 NOTE — PROGRESS NOTES
02/23/24 3245   Reason For Your Visit   Please briefly describe the main reason(s) for your sleep visit Checking for possible sleep problems.   Approximately when did this problem start A couple of years ago.   What are your goals for this visit Diagnose any possible sleep conditions   Time in Bed - Work Or School Days   Do you work or go to school No   What time do you usually get into bed 11:00 PM   About how long does it take you to fall asleep 10 to 30 min.   How often do you have trouble falling asleep couple nights a week   How often do you wake up during the night 2 to 4 times   Do you work days/evenings/nights/rotating shifts Days   What wakes you up at night Pain;External stimuli (bed partner, pets, noise, etc);Use the bathroom;Anxiety   How often do you have trouble falling back to sleep up to a couple times   About how long does it take to fall back to sleep 30 min. to an hour.   What do you usually do if you have trouble getting back to sleep get up, read abook   What time do you usually get out of bed to start your day 7:30 AM   Do you use an alarm Yes   Time in Bed - Weekends/Non-work Days/All Other Days   What time do you usually get into bed 11PM   About how long does it take you to fall asleep 10 to 15 minutes   What time do you usually get out of bed to start your day varies from 7:30 to 8:30 AM   Do you use an alarm Yes   Sleep Need   On average, about how much sleep do you think you get 7 to 8 hours   About how much sleep do you think you need 8 hours a night   Sleep Position   Which sleep positions do you prefer Side   Do you do any of the following activities in bed Other   If other, what Read   How often do you take a nap on purpose none   About how long are your naps I don't.   How often do you doze off unintentionally once or twice a week.   Have you ever had a driving accident or near-miss due to sleepiness/drowsiness Yes   Sleep Disruptions - Breathing/Snoring   Do you snore Yes   Do  other people complain about your snoring Yes   Have you been told you stop breathing in your sleep Yes   Do you have issues with any of the following Morning mouth dryness;Getting up to urinate more than once   Sleep Disruptions - Movement   Do you get pain, discomfort, with an urge to move No   Does it happen when you are resting No   Does it get better if you move around No   Does it happen more at night No   Have you been told you kick your legs at night No   Sleep Disruptions - Behaviours in Sleep   Have you ever experienced any of the following during your sleep Recurring Nightmares   Do you ever experience sudden muscle weakness during the day No   4) Is there anything else you would like your sleep provider to know Don't think so.   Caffeine, Alcohol and Other Substances   How many caffeinated beverages (coffee, tea, soda, energy drinks) per day 3   What time of day is your last caffeine use 6PM   List any prescribed or over the counter stimulants that you take none   List any prescribed or over the counter sleep medication you take none   List previous sleep medications you have tried Melatonin   Do you drink alcohol to help you sleep No   Do you drink alcohol near bedtime No   Family History   Has any family member been diagnosed with a sleep disorder Yes   If yes, please indicate a sister   In the last TWO WEEKS have you experienced any of the following symptoms?   Fevers No   Night Sweats Yes   Weight Gain No   Pain at Night No   Double Vision No   Changes in Vision No   Difficulty Breathing through Nose No   Sore Throat in Morning No   Dry Mouth in the Morning Yes   Shortness of Breath Lying Flat No   Shortness of Breath With Activity No   Awakening with Shortness of Breath No   Increased Cough No   Heart Racing at Night No   Swelling in Feet or Legs No   Diarrhea at Night No   Heartburn at Night Yes   Urinating More than Once at Night Yes   Losing Control of Urine at Night No   Joint Pains at Night No    Headaches in Morning No   Weakness in Arms or Legs No   Depressed Mood No   Anxiety Yes         2/23/2024     5:46 PM    [place] Sleepiness Scale ( JEROME Lau  [id number]<br>ESS - [place]/English - Final version - 21 Nov 07 - [facility] [employer].)   Sitting and reading Slight chance of dozing   Watching TV Moderate chance of dozing   Sitting, inactive in a public place (e.g. a theatre or a meeting) Would never doze   As a passenger in a car for an hour without a break Would never doze   Lying down to rest in the afternoon when circumstances permit Would never doze   Sitting and talking to someone Would never doze   Sitting quietly after a lunch without alcohol Would never doze   In a car, while stopped for a few minutes in traffic Slight chance of dozing   [place] Score (MC) 4   [place] Score (Sleep) 4         2/23/2024     5:38 PM   Insomnia Severity Index (AUSTYN)   Difficulty falling asleep 2   Difficulty staying asleep 2   Problems waking up too early 1   How SATISFIED/DISSATISFIED are you with your CURRENT sleep pattern? 2   How NOTICEABLE to others do you think your sleep problem is in terms of impairing the quality of your life? 2   How WORRIED/DISTRESSED are you about your current sleep problem? 1   To what extent do you consider your sleep problem to INTERFERE with your daily functioning (e.g. daytime fatigue, mood, ability to function at work/daily chores, concentration, memory, mood, etc.) CURRENTLY? 2   AUSTYN Total Score 12         2/23/2024     5:44 PM   STOP BANG Questionnaire (  2008, the American Society of Anesthesiologists, Inc. [employer]   1. Snoring - Do you snore loudly (louder than talking or loud enough to be heard through closed doors)? Yes   2. Tired - Do you often feel tired, fatigued, or sleepy during daytime? Yes   3. Observed - Has anyone observed you stop breathing during your sleep? Yes   4. Blood pressure - Do you have or are you being treated for high blood  pressure? No   5. BMI - BMI more than 35 kg/m2? No   6. Age - Age over 50 yr old? Yes   7. Neck circumference - Neck circumference greater than 40 cm? Yes   8. Gender - Gender male? Yes   STOP BANG Score (MC): 5 (High risk of ARRON)

## 2024-02-26 NOTE — TELEPHONE ENCOUNTER
"Chart review prior to sleep testing.    Patient Summary:  73 year old male who is referred for sleep-disordered breathing.    Patient Active Problem List    Diagnosis Date Noted    Overweight 02/15/2024     Priority: Medium    Venous stasis of both lower extremities 02/15/2024     Priority: Medium    Chronic urticaria 01/30/2023     Priority: Medium    Mixed hyperlipidemia 11/23/2020     Priority: Medium    10 year risk of MI or stroke 7.5% or greater 11/23/2020     Priority: Medium    Typical atrial flutter (H) 11/07/2019     Priority: Medium       Current Outpatient Medications   Medication    apixaban ANTICOAGULANT (ELIQUIS ANTICOAGULANT) 5 MG tablet    atorvastatin (LIPITOR) 10 MG tablet    cetirizine (ZYRTEC) 10 MG tablet    famotidine (PEPCID) 20 MG tablet    hydrOXYzine (ATARAX) 50 MG tablet    metoprolol tartrate (LOPRESSOR) 50 MG tablet     No current facility-administered medications for this visit.       Pertinent PMHx of atrial flutter.    STOP-BANG score of 5, with unknown neck circumference.  [place] score of 4.  AUSTYN: 12    BMI of Estimated body mass index is 28.71 kg/m  as calculated from the following:    Height as of 2/15/24: 1.829 m (6').    Weight as of 2/15/24: 96 kg (211 lb 11.2 oz).     Chief concern per questionnaire: \"Checking for possible sleep problems. \"    Duration of symptoms:  \"A couple of years ago. \"    Goals for visit per questionnaire: \"Diagnose any possible sleep conditions \"    Sleep pattern:  Workdays.  11pm - 7:30am.  Weekends.  11pm to 7:30-8:30am.  Time to fall asleep: ~10-30 minutes.  Awakenings: 2-4 times per night, 30-60 minutes to return to sleep.  Average total sleep time:  7-8 hours  Napping.  0 days per week, - hours per nap.    No for RLS screen.  No for sleep walking.  No for dream enactment behavior.  No for bruxism.    No for morning headaches.  Yes for snoring.  Yes for observed apnea.  Yes for FHx of ARRON - sister.    Caffeine use:  Yes for 3+ per day.  Yes for " within 6 hours of bed.    A/P:    1.)  High likelihood of ARRON with STOP-BANG score of 5.   - Would appear to be candidate for either home sleep testing or in-lab PSG.    ---  This note was written with the assistance of the Dragon voice-dictation technology software. The final document, although reviewed, may contain errors. For corrections, please contact the office.    Mahamed Meneses MD    Sleep Medicine  [place], [place]  Main Office: [phone]  [facility] [address], [address]  Schedule visits: [phone]  Main Office: [phone]  Fax: [phone]

## 2024-03-01 ENCOUNTER — HOSPITAL ENCOUNTER (OUTPATIENT)
Dept: CARDIOLOGY | Facility: OTHER | Age: 74
Discharge: HOME | End: 2024-03-01
Attending: INTERNAL MEDICINE | Admitting: INTERNAL MEDICINE
Payer: MEDICARE

## 2024-03-01 DIAGNOSIS — I48.3 TYPICAL ATRIAL FLUTTER (H): ICD-10-CM

## 2024-03-01 LAB — LVEF ECHO: NORMAL

## 2024-03-01 PROCEDURE — 93306 TTE W/DOPPLER COMPLETE: CPT | Mod: 26 | Performed by: INTERNAL MEDICINE

## 2024-03-01 PROCEDURE — 93306 TTE W/DOPPLER COMPLETE: CPT

## 2024-03-19 LAB — NONINV COLON CA DNA+OCC BLD SCRN STL QL: NEGATIVE

## 2024-04-06 NOTE — PROGRESS NOTES
Duane M Geisler is a 73 year old male who is being evaluated via in-person clinic visit.       Visit Details     In-clinic visit for sleep disordered breathing.     A/P:     1.)  High likelihood of ARRON with STOP-BANG score of 5.  - Would appear to be candidate for either home sleep testing or in-lab PSG.  -Comorbid atrial flutter    I do suspect that his daytime fatigue is related to his antihistamine regimen for management of chronic hives, given his report that the sleepiness and fatigue was not present prior to this.    In general, a primary concern is to see if there is any significant symptoms or breathing that may be affecting daytime fatigue or have increase in long-term cardiovascular risk factors.    Given this we agreed to proceed with WatchPAT home sleep testing with arrhythmia detection.  Orders placed today.    Recommended Sleep Testing/Procedures:    WatchPat    2.)  Medication list updates    I did discontinue hydroxyzine since the patient reported he has discontinued this medication due to daytime sedation.  I also removed Eliquis from his medication list, he never started this medication, barrier was cost.      SUBJECTIVE:  Duane M Geisler is a 73 year old male.    73 year old male who is referred for sleep-disordered breathing.     Pertinent PMHx of atrial flutter.     STOP-BANG score of 5, with unknown neck circumference.  Burtrum score of 4.  AUSTYN: 12     BMI of Estimated body mass index is 28.71 kg/m  as calculated from the following:    Height as of 2/15/24: 1.829 m (6').    Weight as of 2/15/24: 96 kg (211 lb 11.2 oz).      Today -he presents today primarily due to recommendation from his primary physician, Dr. Weeks.  He feels that his daytime fatigue and sleepiness is due to his antihistamine therapy for chronic hives, and feels that there was not any symptoms prior to the start of these medications.  There has been insurance challenges with proceeding with Xolair injections, did have 1  "injection 1 month ago, and reports no hives since that time.  He is still taking the Zyrtec and Benadryl, but he did self discontinue the hydroxyzine due to worsening fatigue.        He also notes that he did not start the Eliquis prescribed for increased 10-year stroke risk due to cost.    Chief concern per questionnaire: \"Checking for possible sleep problems. \"     Duration of symptoms:  \"A couple of years ago. \"     Goals for visit per questionnaire: \"Diagnose any possible sleep conditions \"     Sleep pattern:  Workdays.  11pm - 7:30am.  Weekends.  11pm to 7:30-8:30am.  Time to fall asleep: ~10-30 minutes.  Awakenings: 2-4 times per night, 30-60 minutes to return to sleep.  Average total sleep time:  7-8 hours  Napping.  0 days per week, - hours per nap.     No for RLS screen.  No for sleep walking.  No for dream enactment behavior.  No for bruxism.     No for morning headaches.  Yes for snoring.  Yes for observed apnea.  Yes for FHx of ARRON - sister.     Caffeine use:  Yes for 3+ per day.  Yes for within 6 hours of bed.      Past medical history:    Patient Active Problem List    Diagnosis Date Noted    Overweight 02/15/2024     Priority: Medium    Venous stasis of both lower extremities 02/15/2024     Priority: Medium    Chronic urticaria 01/30/2023     Priority: Medium    Mixed hyperlipidemia 11/23/2020     Priority: Medium    10 year risk of MI or stroke 7.5% or greater 11/23/2020     Priority: Medium    Typical atrial flutter (H) 11/07/2019     Priority: Medium       10 point ROS of systems including Constitutional, Eyes, Respiratory, Cardiovascular, Gastroenterology, Genitourinary, Integumentary, Muscularskeletal, Psychiatric were all negative except for pertinent positives noted in my HPI.    Current Outpatient Medications   Medication Sig Dispense Refill    apixaban ANTICOAGULANT (ELIQUIS ANTICOAGULANT) 5 MG tablet Take 1 tablet (5 mg) by mouth 2 times daily 180 tablet 4    atorvastatin (LIPITOR) 10 MG " tablet Take 1 tablet (10 mg) by mouth daily 90 tablet 4    cetirizine (ZYRTEC) 10 MG tablet Take 1 tablet (10 mg) by mouth daily 90 tablet 4    famotidine (PEPCID) 20 MG tablet Take 1 tablet (20 mg) by mouth daily at 2 pm 90 tablet 4    hydrOXYzine (ATARAX) 50 MG tablet TAKE 1 TABLET BY MOUTH EVERY 6 HOURS AS NEEDED FOR HIVES OR ITCHING (Patient not taking: Reported on 2/15/2024)      metoprolol tartrate (LOPRESSOR) 50 MG tablet Take 1 tablet (50 mg) by mouth 2 times daily 180 tablet 3       OBJECTIVE:  There were no vitals taken for this visit.    Physical Exam     ---  This note was written with the assistance of the Dragon voice-dictation technology software. The final document, although reviewed, may contain errors. For corrections, please contact the office.    Total time spent preparing to see the patient, review of chart, obtaining history and physical examination, review of sleep testing, review of treatment options, education, discussion with patient and documenting in Epic / EMR was 30 minutes.  All time involved was spent on the day of service for the patient (the same day as the patient's appointment).    Mahamed Meneses MD    Sleep Medicine  [place], [place]  Main Office: [phone]  [place]  [address] [place] [address], [address]  Schedule visits: [phone]  Main Office: [phone]  Fax: [phone]    Answers submitted by the patient for this visit:  General Questionnaire (Submitted on 4/8/2024)  Chief Complaint: Chronic problems general questions HPI Form  What is the reason for your visit today? : my primary doctor set it up  How many servings of fruits and vegetables do you eat daily?: 2-3  On average, how many sweetened beverages do you drink each day (Examples: soda, juice, sweet tea, etc.  Do NOT count diet or artificially sweetened  beverages)?: 2  How many minutes a day do you exercise enough to make your heart beat faster?: 9 or less  How many days a week do you exercise enough to make your heart beat faster?: 3 or less  How many days per week do you miss taking your medication?: 0

## 2024-04-08 ENCOUNTER — OFFICE VISIT (OUTPATIENT)
Dept: FAMILY MEDICINE | Facility: OTHER | Age: 74
End: 2024-04-08
Attending: FAMILY MEDICINE
Payer: COMMERCIAL

## 2024-04-08 DIAGNOSIS — R06.81 APNEA: ICD-10-CM

## 2024-04-08 DIAGNOSIS — R06.83 SNORING: Primary | ICD-10-CM

## 2024-04-08 DIAGNOSIS — Z91.89 10 YEAR RISK OF MI OR STROKE 7.5% OR GREATER: ICD-10-CM

## 2024-04-08 DIAGNOSIS — I48.3 TYPICAL ATRIAL FLUTTER (H): ICD-10-CM

## 2024-04-08 PROCEDURE — 99214 OFFICE O/P EST MOD 30 MIN: CPT | Performed by: FAMILY MEDICINE

## 2024-04-08 PROCEDURE — G0463 HOSPITAL OUTPT CLINIC VISIT: HCPCS | Performed by: FAMILY MEDICINE

## 2024-04-08 NOTE — PROGRESS NOTES
Sleep Medicine Clinic Rooming Note    Patient was identified appropriately by name and date of birth.    Medication Reconciliation: complete    Chief complaint: Sleep Consult. Started feeling tired when he started taking the antihistamines, due to chronic hives, wife states that he stops breathing when sleeping, not sure that he has sleep apnea. Is a mouth breather, was brought to the doc as a kid and had enlarge adenoids, runs in the family he believes, does  breath through nose, feels like he doesn't get enough air.  for [place], noticed that he feels tired when he is driving, usually has a glass of ice water with.       Height: 6' ft/inches  Weight: 211 lbs  SpO2: 99  HR: 83  BP: 121/73  Neck circumference: 19.5 inches     [place] Sleepiness Scale    How likely are you to doze off or fall asleep in the following situations, in contrast to just feeling tired?    This refers to your usual way of life recently.    Even if you haven't done some of these things recently, try to figure out how they would have affected you.    Use the following scale to choose the most appropriate number for each situation:  0 = NO CHANCE of dozing  1 = SLIGHT CHANCE of dozing  2 = MODERATE CHANCE of dozing  3 = HIGH CHANCE of dozing    Sitting and Readin  Watching television: 3  Sitting inactive in a public place:2  Riding in car:2  Laying down to rest in the afternoon:1  Sitting and talking to someone:0  Sitting quietly after lunch without alcohol:1  In a car, stopped in traffic for a few minutes:1    Score: 11    DME needs: -    Additional Notes: -

## 2024-04-08 NOTE — COMMUNITY RESOURCES LIST (ENGLISH)
April 8, 2024           YOUR PERSONALIZED LIST OF SERVICES & PROGRAMS               Bill Payment Assistance      [facility] [place] [employer] ([employer])  [address] [place] [address] (Distance: 4.5 miles)  Phone: [phone]  Website: https://Qello/athuwzfn-gbmbrppinu-ypzy/  Language: English  Fee: Free      - Dislocated Worker/Adult [employer] Employment Program  Phone: (764) 340-7959  Email: rileyroe@Daylife  Website: https://Daylife/services/employment-services/dislocated-worker-program/  Language: English, Lithuanian  Hours: Mon 8:00 AM - 4:30 PM Tue 8:00 AM - 4:30 PM Wed 8:00 AM - 4:30 PM Thu 8:00 AM - 4:30 PM Fri 8:00 AM - 4:30 PM  Fee: Free  Accessibility: Ada accessible               IMPORTANT NUMBERS & WEBSITES        Emergency Services  911  .   [facility]  211 http://211unitedway.org  .   Poison Control  (343) 245-7074 http://[place]poison.org http://wis[place]poison.org  .     Suicide and Crisis Lifeline  988 http://988lifeline.org  .   Childhelp National Child Abuse Hotline  602.947.9281 http://Childhelphotline.org   .   [place] Sexual Assault Hotline  (753) 983-2917 (HOPE) http://Pixel Qin.org   .     [place] Runaway Safeline  (377) 117-3541 (RUNAWAY) http://1800runauiu.org  .   Pregnancy & Postpartum Support  Call/text 510-225-2416  [place]: http://ppsupport[place].org  [place]: http://Aquicore.com/wi  .   Substance Abuse National Helpline ([facility])  586-588-HELP (2040) http://Findtreatment.gov   .                DISCLAIMER: These resources have been generated via the [employer] Platform. [employer] does not endorse any service providers mentioned in this resource list. [employer] does not guarantee that the services mentioned in this resource list will be available to you or will improve your health or wellness.    [facility]

## 2024-04-08 NOTE — PATIENT INSTRUCTIONS
Hello Duane,    We will plan to go ahead with the home sleep testing is sent through the mail and worn on the finger and wrist of 1 hand.  Please give a call to [phone] to schedule the test.  We will discuss the results shortly afterwards.    Mahamed Meneses MD    Sleep Medicine  [place], [place]  Main Office: [phone]  [facility] [address], [address]  Schedule visits: [phone]  Main Office: [phone]  Fax: [phone]

## 2024-04-23 ENCOUNTER — VIRTUAL VISIT (OUTPATIENT)
Dept: SLEEP MEDICINE | Facility: HOSPITAL | Age: 74
End: 2024-04-23
Attending: FAMILY MEDICINE
Payer: COMMERCIAL

## 2024-04-23 DIAGNOSIS — R06.83 SNORING: Primary | ICD-10-CM

## 2024-04-24 NOTE — PROGRESS NOTES
Patient was provided both verbal and written education and instructions on use of Watch PAT device. Watch PAT device has been registered and shipped via [employer] on 4/24/2024. Patient was notified that package was mailed out. Watch Baccarat serial number: [id number].    Tracking # [id number]

## 2024-05-06 ENCOUNTER — OFFICE VISIT (OUTPATIENT)
Dept: PEDIATRICS | Facility: OTHER | Age: 74
End: 2024-05-06
Attending: INTERNAL MEDICINE
Payer: COMMERCIAL

## 2024-05-06 VITALS
WEIGHT: 213 LBS | HEIGHT: 72 IN | OXYGEN SATURATION: 99 % | DIASTOLIC BLOOD PRESSURE: 86 MMHG | HEART RATE: 96 BPM | BODY MASS INDEX: 28.85 KG/M2 | SYSTOLIC BLOOD PRESSURE: 124 MMHG | RESPIRATION RATE: 16 BRPM | TEMPERATURE: 98.5 F

## 2024-05-06 DIAGNOSIS — Z29.11 NEED FOR VACCINATION AGAINST RESPIRATORY SYNCYTIAL VIRUS: ICD-10-CM

## 2024-05-06 DIAGNOSIS — Z23 NEED FOR VACCINATION: ICD-10-CM

## 2024-05-06 DIAGNOSIS — L50.8 CHRONIC URTICARIA: ICD-10-CM

## 2024-05-06 DIAGNOSIS — I51.7 LEFT ATRIAL ENLARGEMENT: ICD-10-CM

## 2024-05-06 DIAGNOSIS — I48.3 TYPICAL ATRIAL FLUTTER (H): Primary | ICD-10-CM

## 2024-05-06 DIAGNOSIS — Z23 NEED FOR TDAP VACCINATION: ICD-10-CM

## 2024-05-06 PROCEDURE — G0463 HOSPITAL OUTPT CLINIC VISIT: HCPCS | Mod: 25

## 2024-05-06 PROCEDURE — 99214 OFFICE O/P EST MOD 30 MIN: CPT | Performed by: INTERNAL MEDICINE

## 2024-05-06 PROCEDURE — G2211 COMPLEX E/M VISIT ADD ON: HCPCS | Performed by: INTERNAL MEDICINE

## 2024-05-06 PROCEDURE — 90480 ADMN SARSCOV2 VAC 1/ONLY CMP: CPT

## 2024-05-06 PROCEDURE — G0463 HOSPITAL OUTPT CLINIC VISIT: HCPCS

## 2024-05-06 RX ORDER — RESPIRATORY SYNCYTIAL VIRUS VACCINE 120MCG/0.5
0.5 KIT INTRAMUSCULAR ONCE
Qty: 1 EACH | Refills: 0 | Status: SHIPPED | OUTPATIENT
Start: 2024-05-06 | End: 2024-05-06

## 2024-05-06 RX ORDER — OMALIZUMAB 150 MG/ML
150 INJECTION, SOLUTION SUBCUTANEOUS
COMMUNITY
Start: 2024-02-19

## 2024-05-06 ASSESSMENT — PAIN SCALES - GENERAL: PAINLEVEL: NO PAIN (0)

## 2024-05-06 NOTE — PROGRESS NOTES
Assessment & Plan   1. Typical atrial flutter (H)  2. Left atrial enlargement  Given his EEX8AJ4-SPXw score of 2 I continue to recommend anticoagulation.  The patient declines at this time stating a concern for history of epistaxis.  Was not able to start Eliquis because it was cost prohibitive.  We discussed options and decided on cardiology consultation.  - Adult Cardiology Eval  Referral; Future    3. Chronic urticaria  Follows with allergy.  Finding significant improvement with the use of Xolair.    4. Need for vaccination  - Tdap, tetanus-diptheria-acell pertussis, (BOOSTRIX) 5-2.5-18.5 LF-MCG/0.5 LIA injection; Inject 0.5 mLs into the muscle once for 1 dose  Dispense: 0.5 mL; Refill: 0  - respiratory syncytial virus vaccine, bivalent (ABRYSVO) injection; Inject 0.5 mLs into the muscle once for 1 dose  Dispense: 1 each; Refill: 0  - COVID-19 12+ (2023-24) (PFIZER)    5. Need for Tdap vaccination  6. Need for vaccination against respiratory syncytial virus    Patient Instructions    -- Talk to pharmacy if other DOACs are covered (eg Xarelto) vs warfarin   -- Consider Watchman procedure   -- Consult to Cardiology      No follow-ups on file.    Signed, Bryant Weeks MD, FAAP, FACP  Internal Medicine & Pediatrics    Subjective   Duane M Geisler is a 73 year old male who presents for Follow Up (ECHO) and Recheck Medication (Did not start the Eliquis- cost of med).    Objective   Vitals: /86   Pulse 96   Temp 98.5  F (36.9  C) (Tympanic)   Resp 16   Ht 1.829 m (6')   Wt 96.6 kg (213 lb)   SpO2 99%   BMI 28.89 kg/m        Review and Analysis of Data   I personally reviewed the following:  External notes: No  Results: Yes EKG, echo  Use of an independent historian: No  Independent review of a test performed by another physician: No  Discussion of management with another physician: No  Moderate risk of morbidity from additional diagnostic testing and/or treatment.

## 2024-05-06 NOTE — PATIENT INSTRUCTIONS
-- Talk to pharmacy if other DOACs are covered (eg Xarelto) vs warfarin   -- Consider Watchman procedure   -- Consult to Cardiology

## 2024-05-06 NOTE — NURSING NOTE
Chief Complaint   Patient presents with    Follow Up     ECHO    Recheck Medication     Did not start the Eliquis- cost of med       Initial /86   Pulse 96   Temp 98.5  F (36.9  C) (Tympanic)   Resp 16   Ht 1.829 m (6')   Wt 96.6 kg (213 lb)   SpO2 99%   BMI 28.89 kg/m   Estimated body mass index is 28.89 kg/m  as calculated from the following:    Height as of this encounter: 1.829 m (6').    Weight as of this encounter: 96.6 kg (213 lb).  Medication Review: complete    The next two questions are to help us understand your food security.  If you are feeling you need any assistance in this area, we have resources available to support you today.          4/8/2024   SDOH- Food Insecurity   Within the past 12 months, did you worry that your food would run out before you got money to buy more? N   Within the past 12 months, did the food you bought just not last and you didn t have money to get more? N         Health Care Directive:  Patient does not have a Health Care Directive or Living Will: Discussed advance care planning with patient; however, patient declined at this time.    Norma J. Gosselin, LPN

## 2024-05-13 NOTE — PROGRESS NOTES
"Service Date: 2024    Bryant Weeks MD  [address] [place] [address]     RE:  Duane M Geisler   MRN:  [id number]   :  1950       Dear Dr. Weeks:    It was a pleasure participating in the care of your patient, Duane M Geisler .  As you know, he is a 73 year old year old person who I met over a virtual visit today for atrial arrythmias, blood pressure control, lipids.    Past medical history is significant for the followin.  Hyperlipidemia  2.  Chronic urticaria  3.  Possible sleep-related breathing disorder  4.  History of epistaxis    The patient has a history of atrial arrhythmias (atrial fibrillation/aflutter) documented on EKG 2019.  At that time the EKG revealed atrial fibrillation at a rate of 90 bpm.    The patient saw you fairly recently, and due to his OPH6EU9-QVPi 2 score of 2 for age only, and DOAC being cost prohibitive, he was referred here for further evaluation and treatment of his atrial arrhythmias.    From a clinical standpoint, it should be noted that the patient is a somewhat verbose and tangential historian.  According to him he feels rapid palpitations perhaps once a month, if he \"pops into bed\" and is aware of his heart rate.  He otherwise is not particularly active and only takes walks for about 10 minutes a day.  He does participate in some yard work when the weather is good.    Notably he upon more detailed questioning does not have an actual history of epistaxis, and says that after taking a higher dose of aspirin only had some mild blood seen on a Kleenex in his nose.  He has not had to go to the emergency department for any nosebleeds, and denies any black or bloody stools in the past.  He denies having any significant bleeding contraindications.    Cardiac risk factors: No history of diabetes, no history of hypertension, he does not smoke, rarely drinks, mother had valvular disease, he does have hyperlipidemia.    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    10 Point Review of Systems: Positive for snoring and likely obstructive sleep apnea    MEDICATIONS:    1.  Atorvastatin 10 mg a day  2.  Metoprolol to tartrate 50 mg twice daily  3.  Xolair    PHYSICAL EXAM:    5/6/2024: Blood pressure 124/86, pulse 96, weight 213 pounds  General:  Patient appears comfortable, well groomed  Psych:  Patient is alert and oriented X 3  Eyes:  No gross erythema, exudate  Respiratory:  Patient is breathing comfortably without gross cough    The remainder of the comprehensive physical exam was deferred secondary to the COVID-19 pandemic and secondary to virtual visit restrictions.    LABS:    2/15/2024: LDL 92, triglycerides 189, potassium 4.1, GFR normal, hemoglobin normal, TSH normal    Echocardiogram 3/1/2024 reveals an ejection fraction of 55 to 60%, no significant valvular pathology identified, normal PA pressure, normal aortic root, moderate left atrial enlargement reported    Abdominal ultrasound 1/6/2022 reveals no significant abdominal aortic aneurysm    IMPRESSION:    Duane is a 73-year-old gentleman, with a past medical history significant for a likely/possible sleep-related breathing disorder, who presents with the following issues:    1.  Atrial arrhythmias (atrial fibrillation/atrial flutter)    EKG 11/7/2019 documented atrial fibrillation at a rate of 90 bpm.  ZYK8EM6-YPUe 2 score is 2 for age only.    He is currently on metoprolol tartrate 50 mg twice daily, and a baby aspirin enteric-coated 81 mg a day.    The patient actually does not have a documented history of epistaxis and only noted a minimal amount of blood on the Kleenex when he took higher aspirin doses in the past.  He denies visits to the emergency department for nosebleeds, or black or bloody stools, and  "denies any bleeding contraindications.  Notably DOACs were found to be cost prohibitive for him causing $600 a month.    He only feels rapid palpitations perhaps once a month, and further noninvasive evaluation to evaluate his current arrhythmia status would be indicated.      2.  Blood pressure control    Blood pressures currently running 124/86, continue to follow    3.  Lipids    2/15/2024, LDL currently 92, triglycerides 189, patient wishes to pursue lifestyle modifications for now.    PLAN:    1.  Mediterranean diet, 20 pound weight loss, and increase walking to 45 minutes a day.    2.  14-day Zio patch monitor, in order to document current status of rhythm, overall burden of arrhythmias, and speed to help guide further treatment.    3.  Continue with home sleep study as directed and follow recommendations per Dr. Meneses    4.  If his Zio patch monitor should indicate significant burdens of atrial fibrillation and or atrial flutter the patient may be willing to start Coumadin in the future in order to decrease risk of thromboembolic event.  However at this point in time he declines and wishes to continue on baby aspirin for now.    After Zio patch monitor results are available for review, he would like to meet again for a video visit follow-up to discuss these results and implications for future treatment.      Once again, it was a pleasure participating in the care of your patient, Duane M Geisler .  Please feel free to contact me at any time if there are any questions regarding his care in the future.      Sincerely,          Adan Hurtado M.D.  Cardiovascular Division  [facility]    The patient has been notified of following:     \"This video visit will be conducted via a call between you and your physician/provider. We have found that certain health care needs can be provided without the need for an in-person physical exam.  This service lets us provide the care you need with a video " "conversation.  If a prescription is necessary we can send it directly to your pharmacy.  If lab work is needed we can place an order for that and you can then stop by our lab to have the test done at a later time.    Video visits are billed at different rates depending on your insurance coverage.  Please reach out to your insurance provider with any questions.    If during the course of the call the physician/provider feels a video visit is not appropriate, you will not be charged for this service.\"    Patient has given verbal consent for video visit? Yes    How would you like to obtain your AVS? Mail    Video-Visit Details    Type of service:  Video Visit    Video Start Time:1039am    Video End Time:1109am    Total visit time including video visit, chart review, charting, coordination of care =63min    Originating Location (pt. Location):patient home      Distant Location (provider location):   Off site home office    Platform used for Video Visit: Darby PARMAR#:[id number]           "

## 2024-05-14 ENCOUNTER — VIRTUAL VISIT (OUTPATIENT)
Dept: CARDIOLOGY | Facility: OTHER | Age: 74
End: 2024-05-14
Attending: INTERNAL MEDICINE
Payer: COMMERCIAL

## 2024-05-14 VITALS — HEIGHT: 72 IN | WEIGHT: 205 LBS | BODY MASS INDEX: 27.77 KG/M2

## 2024-05-14 DIAGNOSIS — I48.3 TYPICAL ATRIAL FLUTTER (H): ICD-10-CM

## 2024-05-14 DIAGNOSIS — I51.7 LEFT ATRIAL ENLARGEMENT: ICD-10-CM

## 2024-05-14 PROCEDURE — 99205 OFFICE O/P NEW HI 60 MIN: CPT | Mod: 95 | Performed by: INTERNAL MEDICINE

## 2024-05-14 ASSESSMENT — PAIN SCALES - GENERAL: PAINLEVEL: NO PAIN (0)

## 2024-05-14 NOTE — PATIENT INSTRUCTIONS
1.  Mediterranean diet, 20 pound weight loss, and increase walking to 45 minutes a day.    2.  14-day Zio patch monitor    Followup TBD pending results

## 2024-05-14 NOTE — NURSING NOTE
No other vitals to report today      Is the patient currently in the state of [place]? YES    Visit mode:VIDEO    If the visit is dropped, the patient can be reconnected by: VIDEO VISIT: Send to e-mail at: [email or web address]     Will anyone else be joining the visit? NO  (If patient encounters technical issues they should call [phone]    How would you like to obtain your AVS? MyChart    Are changes needed to the allergy or medication list? No    Are refills needed on medications prescribed by this physician? NA    Reason for visit: Consult    CHEYANNE RicoF

## 2024-05-21 ENCOUNTER — ALLIED HEALTH/NURSE VISIT (OUTPATIENT)
Dept: FAMILY MEDICINE | Facility: OTHER | Age: 74
End: 2024-05-21
Attending: INTERNAL MEDICINE
Payer: MEDICARE

## 2024-05-21 DIAGNOSIS — I48.3 TYPICAL ATRIAL FLUTTER (H): ICD-10-CM

## 2024-05-21 PROCEDURE — 93246 EXT ECG>7D<15D RECORDING: CPT

## 2024-05-21 PROCEDURE — 999N000157 HC STATISTIC RCP TIME EA 10 MIN

## 2024-05-21 NOTE — PROGRESS NOTES
Patient arrived (date)05/21/2024 (time)1620 for a 14 day Zio monitor per (provider name) Si for A-Flutter (Dx).  Patient's skin was prepped per protocol.  Zio monitor was placed.  Patient verbalized understanding of instructions and plan of care.  Patient was given Zio diary and prepaid .  Respiratory Therapist reviewed Zio Patch placement process and asked patient if they are comfortable proceeding with placement. Patient (is or is not) is comfortable proceeding. Patient (would or would not) would not like an employee of same gender to be (present or to place) present or to place the Zio Patch.  Documentation of Zio Patch site (Bleeding or Not Bleeding) Not Bleeding  If there is bleeding Documentation of why. N/A  Documentation of accomodation made for patient. No    Cecile Vega, RT

## 2024-05-22 ENCOUNTER — DOCUMENTATION ONLY (OUTPATIENT)
Dept: SLEEP MEDICINE | Facility: HOSPITAL | Age: 74
End: 2024-05-22
Attending: FAMILY MEDICINE
Payer: MEDICARE

## 2024-05-22 DIAGNOSIS — Z91.89 10 YEAR RISK OF MI OR STROKE 7.5% OR GREATER: ICD-10-CM

## 2024-05-22 DIAGNOSIS — I48.3 TYPICAL ATRIAL FLUTTER (H): ICD-10-CM

## 2024-05-22 DIAGNOSIS — R06.83 SNORING: ICD-10-CM

## 2024-05-22 DIAGNOSIS — R06.81 APNEA: ICD-10-CM

## 2024-05-22 PROCEDURE — 95800 SLP STDY UNATTENDED: CPT

## 2024-05-22 PROCEDURE — 95800 SLP STDY UNATTENDED: CPT | Mod: 26 | Performed by: FAMILY MEDICINE

## 2024-05-22 NOTE — PROGRESS NOTES
WatchPAT data has been received and has been scored using rule 1B, 4%. Patient to follow up with provider to determine appropriate therapy.    Pat AHI: 28.6    Ordering Provider: Dr Mahamed Meneses      Sleep Technician/Technologist: Neha YOON

## 2024-05-23 NOTE — PROCEDURES
[facility] - HOME SLEEP STUDY INTERPRETATION    Patient: Duane M Geisler  MRN: [id number]  YOB: 1950  Study Date: 5/20/2024  Referring Provider: Bryant Weeks  Ordering Provider: Mahamed Meneses MD, MD    Chain of custody patient verification was not enabled.       Indications for Home Study: Duane M Geisler is a 73 year old male with a history of atrial flutter who presents with symptoms suggestive of obstructive sleep apnea.    Estimated body mass index is 27.8 kg/m  as calculated from the following:    Height as of 5/14/24: 1.829 m (6').    Weight as of 5/14/24: 93 kg (205 lb).  Total score - [place]: 4 (2/23/2024  5:46 PM)  Total Score: 6 (2/23/2024  5:44 PM)    Data: A full night home sleep study was performed recording the standard physiologic parameters including peripheral arterial tonometry (PAT), sound/snoring, body position,  movement, sound, and oxygen saturation by pulse oximetry. Pulse rate was estimated by oximetry recording. Sleep staging (wake, REM, light, and deep sleep) was derived from PAT signal.  This study was considered adequate based on > 4 hours of quality oximetry and respiratory recording. As specified by the AASM Manual for the Scoring of Sleep and Associated events, version 2.3, Rule VIII.D 1B, 4% oxygen desaturation scoring for hypopneas is used as a standard of care on all home sleep apnea testing.    Total Recording Time: 7 hrs, 48 min  Total Sleep Time: 7 hrs, 8 min  % of Sleep Time REM: 15%    Respiratory:  Snoring: Snoring was present.  Respiratory events: The PAT respiratory disturbance index [pRDI] was 29.7 events per hour.  The PAT apnea/hypopnea index [pAHI] was 28.6 events per hour.  LILLY was 28.4 events per hour.  During REM sleep the pAHI was 35.5.  Sleep Associated Hypoxemia: sustained hypoxemia was not present. Mean oxygen saturation was 94%.  Minimum was 81%.  Time with saturation less than 88% was 2.4 minutes.    Heart Rate: By pulse oximetry  normal rate was noted.     Position: Percent of time spent: supine - 55.9%, prone - 0%, on right - 35.1%, on left - 9%.  pAHI was 22.1 per hour supine, - per hour prone, 29.8 per hour on right side, and 65.1 per hour on left side.     Assessment:   Moderate obstructive sleep apnea.  Sleep associated hypoxemia was not present.    Recommendations:  Consider auto-CPAP at 5-15 cmH2O, oral appliance therapy, or polysomnography with full night PAP titration.  Suggest optimizing sleep hygiene and avoiding sleep deprivation.  Weight management.    Diagnosis Code(s): Obstructive Sleep Apnea G47.33    Mahamed Meneses MD, MD, May 23, 2024   Diplomate, American Board of [employer] Medicine, Sleep Medicine

## 2024-05-30 ENCOUNTER — TELEPHONE (OUTPATIENT)
Dept: PULMONOLOGY | Facility: OTHER | Age: 74
End: 2024-05-30

## 2024-05-30 NOTE — TELEPHONE ENCOUNTER
Patient needs to be rescheduled for their virtual visit due to Reason for Reschedule: Patient Request    Appointment mode: Video  Provider: Mahamed Meneses MD Jacqueline McCaskill, Carrie Facilitator

## 2025-01-23 ENCOUNTER — APPOINTMENT (OUTPATIENT)
Dept: MRI IMAGING | Facility: OTHER | Age: 75
End: 2025-01-23
Attending: PHYSICIAN ASSISTANT
Payer: MEDICARE

## 2025-01-23 ENCOUNTER — HOSPITAL ENCOUNTER (EMERGENCY)
Facility: OTHER | Age: 75
Discharge: HOME | End: 2025-01-23
Attending: PHYSICIAN ASSISTANT
Payer: MEDICARE

## 2025-01-23 ENCOUNTER — NURSE TRIAGE (OUTPATIENT)
Dept: PEDIATRICS | Facility: OTHER | Age: 75
End: 2025-01-23
Payer: COMMERCIAL

## 2025-01-23 VITALS
DIASTOLIC BLOOD PRESSURE: 83 MMHG | TEMPERATURE: 97.1 F | RESPIRATION RATE: 20 BRPM | BODY MASS INDEX: 27.77 KG/M2 | OXYGEN SATURATION: 96 % | SYSTOLIC BLOOD PRESSURE: 101 MMHG | WEIGHT: 205 LBS | HEART RATE: 80 BPM | HEIGHT: 72 IN

## 2025-01-23 DIAGNOSIS — I48.3 TYPICAL ATRIAL FLUTTER (H): ICD-10-CM

## 2025-01-23 DIAGNOSIS — I48.91 ATRIAL FIBRILLATION (H): ICD-10-CM

## 2025-01-23 DIAGNOSIS — E23.6 PITUITARY MASS: Primary | ICD-10-CM

## 2025-01-23 DIAGNOSIS — H53.139 SUDDEN LOSS OF VISION: ICD-10-CM

## 2025-01-23 DIAGNOSIS — D35.2 PITUITARY MACROADENOMA (H): ICD-10-CM

## 2025-01-23 LAB
ANION GAP SERPL CALCULATED.3IONS-SCNC: 9 MMOL/L (ref 7–15)
APTT PPP: 30 SECONDS (ref 22–38)
ATRIAL RATE - MUSE: 72 BPM
BASOPHILS # BLD AUTO: 0.1 10E3/UL (ref 0–0.2)
BASOPHILS NFR BLD AUTO: 1 %
BUN SERPL-MCNC: 21.6 MG/DL (ref 8–23)
CALCIUM SERPL-MCNC: 9.4 MG/DL (ref 8.8–10.4)
CHLORIDE SERPL-SCNC: 105 MMOL/L (ref 98–107)
CREAT SERPL-MCNC: 1.09 MG/DL (ref 0.67–1.17)
DIASTOLIC BLOOD PRESSURE - MUSE: NORMAL MMHG
EGFRCR SERPLBLD CKD-EPI 2021: 70 ML/MIN/1.73M2
EOSINOPHIL # BLD AUTO: 0.4 10E3/UL (ref 0–0.7)
EOSINOPHIL NFR BLD AUTO: 5 %
ERYTHROCYTE [DISTWIDTH] IN BLOOD BY AUTOMATED COUNT: 13.1 % (ref 10–15)
GLUCOSE SERPL-MCNC: 87 MG/DL (ref 70–99)
HCO3 SERPL-SCNC: 26 MMOL/L (ref 22–29)
HCT VFR BLD AUTO: 40.2 % (ref 40–53)
HGB BLD-MCNC: 13.5 G/DL (ref 13.3–17.7)
HOLD SPECIMEN: NORMAL
IMM GRANULOCYTES # BLD: 0 10E3/UL
IMM GRANULOCYTES NFR BLD: 0 %
INR PPP: 0.97 (ref 0.85–1.15)
INTERPRETATION ECG - MUSE: NORMAL
LYMPHOCYTES # BLD AUTO: 2.2 10E3/UL (ref 0.8–5.3)
LYMPHOCYTES NFR BLD AUTO: 29 %
MCH RBC QN AUTO: 30.5 PG (ref 26.5–33)
MCHC RBC AUTO-ENTMCNC: 33.6 G/DL (ref 31.5–36.5)
MCV RBC AUTO: 91 FL (ref 78–100)
MONOCYTES # BLD AUTO: 0.6 10E3/UL (ref 0–1.3)
MONOCYTES NFR BLD AUTO: 8 %
NEUTROPHILS # BLD AUTO: 4.4 10E3/UL (ref 1.6–8.3)
NEUTROPHILS NFR BLD AUTO: 57 %
NRBC # BLD AUTO: 0 10E3/UL
NRBC BLD AUTO-RTO: 0 /100
P AXIS - MUSE: NORMAL DEGREES
PLATELET # BLD AUTO: 284 10E3/UL (ref 150–450)
POTASSIUM SERPL-SCNC: 4.2 MMOL/L (ref 3.4–5.3)
PR INTERVAL - MUSE: NORMAL MS
QRS DURATION - MUSE: 86 MS
QT - MUSE: 370 MS
QTC - MUSE: 447 MS
R AXIS - MUSE: 8 DEGREES
RBC # BLD AUTO: 4.42 10E6/UL (ref 4.4–5.9)
SODIUM SERPL-SCNC: 140 MMOL/L (ref 135–145)
SYSTOLIC BLOOD PRESSURE - MUSE: NORMAL MMHG
T AXIS - MUSE: -2 DEGREES
TROPONIN T SERPL HS-MCNC: 12 NG/L
VENTRICULAR RATE- MUSE: 88 BPM
WBC # BLD AUTO: 7.8 10E3/UL (ref 4–11)

## 2025-01-23 PROCEDURE — 70549 MR ANGIOGRAPH NECK W/O&W/DYE: CPT

## 2025-01-23 PROCEDURE — 70553 MRI BRAIN STEM W/O & W/DYE: CPT

## 2025-01-23 PROCEDURE — 255N000002 HC RX 255 OP 636: Performed by: PHYSICIAN ASSISTANT

## 2025-01-23 PROCEDURE — 99285 EMERGENCY DEPT VISIT HI MDM: CPT | Mod: 25 | Performed by: PHYSICIAN ASSISTANT

## 2025-01-23 PROCEDURE — A9575 INJ GADOTERATE MEGLUMI 0.1ML: HCPCS | Performed by: PHYSICIAN ASSISTANT

## 2025-01-23 PROCEDURE — 85730 THROMBOPLASTIN TIME PARTIAL: CPT | Performed by: PHYSICIAN ASSISTANT

## 2025-01-23 PROCEDURE — 99207 PR NO CHARGE LOS: CPT | Performed by: PHYSICIAN ASSISTANT

## 2025-01-23 PROCEDURE — 36415 COLL VENOUS BLD VENIPUNCTURE: CPT | Performed by: PHYSICIAN ASSISTANT

## 2025-01-23 PROCEDURE — 85610 PROTHROMBIN TIME: CPT | Performed by: PHYSICIAN ASSISTANT

## 2025-01-23 PROCEDURE — 80048 BASIC METABOLIC PNL TOTAL CA: CPT | Performed by: PHYSICIAN ASSISTANT

## 2025-01-23 PROCEDURE — 85048 AUTOMATED LEUKOCYTE COUNT: CPT | Performed by: PHYSICIAN ASSISTANT

## 2025-01-23 PROCEDURE — 99284 EMERGENCY DEPT VISIT MOD MDM: CPT | Performed by: PHYSICIAN ASSISTANT

## 2025-01-23 PROCEDURE — 84484 ASSAY OF TROPONIN QUANT: CPT | Performed by: PHYSICIAN ASSISTANT

## 2025-01-23 PROCEDURE — 70544 MR ANGIOGRAPHY HEAD W/O DYE: CPT

## 2025-01-23 PROCEDURE — 93010 ELECTROCARDIOGRAM REPORT: CPT | Performed by: INTERNAL MEDICINE

## 2025-01-23 PROCEDURE — 93005 ELECTROCARDIOGRAM TRACING: CPT | Performed by: PHYSICIAN ASSISTANT

## 2025-01-23 PROCEDURE — 85004 AUTOMATED DIFF WBC COUNT: CPT | Performed by: PHYSICIAN ASSISTANT

## 2025-01-23 RX ORDER — GADOTERATE MEGLUMINE 376.9 MG/ML
20 INJECTION INTRAVENOUS ONCE
Status: COMPLETED | OUTPATIENT
Start: 2025-01-23 | End: 2025-01-23

## 2025-01-23 RX ADMIN — GADOTERATE MEGLUMINE 19 ML: 376.9 INJECTION INTRAVENOUS at 16:07

## 2025-01-23 ASSESSMENT — COLUMBIA-SUICIDE SEVERITY RATING SCALE - C-SSRS
6. HAVE YOU EVER DONE ANYTHING, STARTED TO DO ANYTHING, OR PREPARED TO DO ANYTHING TO END YOUR LIFE?: NO
2. HAVE YOU ACTUALLY HAD ANY THOUGHTS OF KILLING YOURSELF IN THE PAST MONTH?: NO
1. IN THE PAST MONTH, HAVE YOU WISHED YOU WERE DEAD OR WISHED YOU COULD GO TO SLEEP AND NOT WAKE UP?: NO

## 2025-01-23 ASSESSMENT — ACTIVITIES OF DAILY LIVING (ADL)
ADLS_ACUITY_SCORE: 41

## 2025-01-23 ASSESSMENT — ENCOUNTER SYMPTOMS
FEVER: 0
CHILLS: 0
SHORTNESS OF BREATH: 0
DYSURIA: 0
COUGH: 0
ABDOMINAL PAIN: 0

## 2025-01-23 NOTE — DISCHARGE INSTRUCTIONS
Get plenty fluids and rest.  As discussed, in general, happy to help you.  To have atrial fibrillation today, no obvious sign of stroke and after speaking with her stroke neurology team they think less likely a mini stroke/transient ischemic attack.  It is recommended that she start anticoagulation medication.  Currently,  it was decided to hold off while awaiting to see if your insurance will cover this medication.  Stroke neurology did place referral for you to follow-up with ophthalmology as well as neurosurgery.  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  call to schedule follow-up with your PCP for reassessment.  Return to the ED if there are worsening or concerning symptoms.

## 2025-01-23 NOTE — PROGRESS NOTES
"Stroke team was following peripherally for results of MRI/MRAs.  No concerning vessel pathology.  MRI negative for stroke but did show concerns for pituitary macroadenoma.    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      Maribel Blancas PA-C  Vascular Neurology    To page me or covering stroke neurology team member, click here: AMCOM  Choose \"On Call\" tab at top, then select \"NEUROLOGY/ALL SITES\" from middle drop-down box, press Enter, then look for \"stroke\" or \"telestroke\" for your site.   "

## 2025-01-23 NOTE — ED NOTES
MRI checklist completed  
Patient provided MRI checklist  
Patient to MRI  
Provider at bedside  
No
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
## 2025-01-23 NOTE — TELEPHONE ENCOUNTER
Spouse states they will present to ER. Appointment cancelled. Dominga Arriaga RN on 1/23/2025 at 8:50 AM

## 2025-01-23 NOTE — TELEPHONE ENCOUNTER
Agree with ER.  If he refuses, I'll see him.    Signed, Bryant Weeks MD, FAAP, FACP  Internal Medicine & Pediatrics
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
## 2025-01-23 NOTE — ED PROVIDER NOTES
"  History     Chief Complaint   Patient presents with    Eye Problem     HPI  Duane M Geisler is a 74 year old male who presents to the ED for evaluation of a loss of vision. He presents to triage via private vehicle with his wife for complaints of recent vision changes.  Patient states yesterday when driving his eyes suddenly went out of focus and states, \"then it was like a bright light and then it went black\".  Patient states this caused patient to go into the ditch, however, states while going into the ditch his vision suddenly returned and states there was no impact to the vehicle and states he was able to pull out of the ditch.  Patient denies any sx precipitating incident and states, \"I felt totally normal\".  Patient denies hx of stroke, TIA, or any dx of eye problems. Patient denies recent falls or trauma.   Significant symptoms had onset 24 hour(s) ago.    Allergies:  No Known Allergies    Problem List:    Patient Active Problem List    Diagnosis Date Noted    Left atrial enlargement 05/06/2024     Priority: Medium    Overweight 02/15/2024     Priority: Medium    Venous stasis of both lower extremities 02/15/2024     Priority: Medium    Chronic urticaria 01/30/2023     Priority: Medium    Mixed hyperlipidemia 11/23/2020     Priority: Medium    10 year risk of MI or stroke 7.5% or greater 11/23/2020     Priority: Medium    Typical atrial flutter (H) 11/07/2019     Priority: Medium        Past Medical History:    Past Medical History:   Diagnosis Date    Other specified postprocedural states     Personal history of other medical treatment (CODE)     Tubulo-interstitial nephritis        Past Surgical History:    Past Surgical History:   Procedure Laterality Date    COLONOSCOPY      12/2002, normal f/u 10 years    COLONOSCOPY  03/05/2014    3/2014,Moderate diverticulosis - follow up 10 years    ESOPHAGOSCOPY, GASTROSCOPY, DUODENOSCOPY (EGD), COMBINED      5/2003, food obstruction       Family History:    Family " History   Problem Relation Age of Onset    Heart Disease Mother         Heart Disease,h/o valve replacement    Blood Disease Mother         Blood Disease,myelodysplasia    Diabetes Father         Diabetes    Cancer Maternal Grandmother         Cancer, lung cancer    Microcephaly Maternal Grandfather     Myocardial Infarction Maternal Grandfather     Colon Cancer No family hx of        Social History:  Marital Status:   [2]  Social History     Tobacco Use    Smoking status: Never    Smokeless tobacco: Never   Vaping Use    Vaping status: Never Used   Substance Use Topics    Alcohol use: Not Currently     Alcohol/week: 2.5 standard drinks of alcohol     Comment: rare    Drug use: Never        Medications:    apixaban ANTICOAGULANT (ELIQUIS) 5 MG tablet  atorvastatin (LIPITOR) 10 MG tablet  cetirizine (ZYRTEC) 10 MG tablet  metoprolol tartrate (LOPRESSOR) 50 MG tablet  XOLAIR 150 MG/ML SOSY injection          Review of Systems   Constitutional:  Negative for chills and fever.   HENT:  Negative for congestion.    Eyes:  Positive for visual disturbance.   Respiratory:  Negative for cough and shortness of breath.    Cardiovascular:  Negative for chest pain.   Gastrointestinal:  Negative for abdominal pain.   Genitourinary:  Negative for dysuria.   Neurological:  Positive for syncope.       Physical Exam   BP: 129/87  Pulse: 86  Temp: 97.1  F (36.2  C)  Resp: 20  Height: 182.9 cm (6')  Weight: 93 kg (205 lb)  SpO2: 98 %      Physical Exam  Constitutional:       General: He is not in acute distress.     Appearance: He is well-developed. He is not diaphoretic.   HENT:      Head: Normocephalic and atraumatic.   Eyes:      Extraocular Movements: Extraocular movements intact.      Pupils: Pupils are equal, round, and reactive to light.   Cardiovascular:      Rate and Rhythm: Normal rate. Rhythm irregular.   Pulmonary:      Effort: Pulmonary effort is normal.      Breath sounds: Normal breath sounds.   Abdominal:       Palpations: Abdomen is soft.      Tenderness: There is no abdominal tenderness.   Skin:     General: Skin is warm and dry.      Coloration: Skin is not jaundiced.      Findings: No rash.   Neurological:      Mental Status: He is alert. Mental status is at baseline.   Psychiatric:         Mood and Affect: Mood normal.         Behavior: Behavior normal.         ED Course        Procedures         EKG read at 1110. HR variable ~88, atrial fibrillation.     Critical Care time:  none              Results for orders placed or performed during the hospital encounter of 01/23/25 (from the past 24 hours)   [place] Draw    Narrative    The following orders were created for panel order [place] Draw.  Procedure                               Abnormality         Status                     ---------                               -----------         ------                     Extra Blue Top Tube[[id number]]                              Final result               Extra Red Top Tube[[id number]]                               Final result               Extra Green Top (Lithium...[[id number]]                      Final result               Extra Green Top (Lithium...[[id number]]                      Final result               Extra Purple Top Tube[[id number]]                            Final result                 Please view results for these tests on the individual orders.   Extra Blue Top Tube   Result Value Ref Range    Hold Specimen JIC    Extra Red Top Tube   Result Value Ref Range    Hold Specimen JIC    Extra Green Top (Lithium Heparin) Tube   Result Value Ref Range    Hold Specimen JIC    Extra Green Top (Lithium Heparin) Tube   Result Value Ref Range    Hold Specimen JIC    Extra Purple Top Tube   Result Value Ref Range    Hold Specimen JIC    CBC with Platelets & Differential    Narrative    The following orders were created for panel order CBC with Platelets & Differential.  Procedure                               Abnormality          Status                     ---------                               -----------         ------                     CBC with platelets and d...[[id number]]                      Final result                 Please view results for these tests on the individual orders.   Basic metabolic panel   Result Value Ref Range    Sodium 140 135 - 145 mmol/L    Potassium 4.2 3.4 - 5.3 mmol/L    Chloride 105 98 - 107 mmol/L    Carbon Dioxide (CO2) 26 22 - 29 mmol/L    Anion Gap 9 7 - 15 mmol/L    Urea Nitrogen 21.6 8.0 - 23.0 mg/dL    Creatinine 1.09 0.67 - 1.17 mg/dL    GFR Estimate 70 >60 mL/min/1.73m2    Calcium 9.4 8.8 - 10.4 mg/dL    Glucose 87 70 - 99 mg/dL   INR   Result Value Ref Range    INR 0.97 0.85 - 1.15   Partial thromboplastin time   Result Value Ref Range    aPTT 30 22 - 38 Seconds   Troponin T, High Sensitivity   Result Value Ref Range    Troponin T, High Sensitivity 12 <=22 ng/L   CBC with platelets and differential   Result Value Ref Range    WBC Count 7.8 4.0 - 11.0 10e3/uL    RBC Count 4.42 4.40 - 5.90 10e6/uL    Hemoglobin 13.5 13.3 - 17.7 g/dL    Hematocrit 40.2 40.0 - 53.0 %    MCV 91 78 - 100 fL    MCH 30.5 26.5 - 33.0 pg    MCHC 33.6 31.5 - 36.5 g/dL    RDW 13.1 10.0 - 15.0 %    Platelet Count 284 150 - 450 10e3/uL    % Neutrophils 57 %    % Lymphocytes 29 %    % Monocytes 8 %    % Eosinophils 5 %    % Basophils 1 %    % Immature Granulocytes 0 %    NRBCs per 100 WBC 0 <1 /100    Absolute Neutrophils 4.4 1.6 - 8.3 10e3/uL    Absolute Lymphocytes 2.2 0.8 - 5.3 10e3/uL    Absolute Monocytes 0.6 0.0 - 1.3 10e3/uL    Absolute Eosinophils 0.4 0.0 - 0.7 10e3/uL    Absolute Basophils 0.1 0.0 - 0.2 10e3/uL    Absolute Immature Granulocytes 0.0 <=0.4 10e3/uL    Absolute NRBCs 0.0 10e3/uL   EKG 12-lead, tracing only   Result Value Ref Range    Systolic Blood Pressure  mmHg    Diastolic Blood Pressure  mmHg    Ventricular Rate 88 BPM    Atrial Rate 72 BPM    WI Interval  ms    QRS Duration 86 ms     ms     QTc 447 ms    P Axis  degrees    R AXIS 8 degrees    T Axis -2 degrees    Interpretation ECG       Atrial fibrillation  Abnormal ECG  No previous ECGs available  Confirmed by MD AYALA KEITH ([id number]) on 1/23/2025 5:02:12 PM     MRA Brain ([place] [facility] [place]) wo Contrast    Narrative    EXAM: MRA BRAIN ([place] [facility] [place]) W/O CONTRAST, MRA NECK (CAROTIDS)  W/O & W CONTRAST 1/23/2025 3:52 PM    HISTORY: Acute neuro deficit, stroke suspected, Amaurosis fugax.    TECHNIQUE:    Brain MRI:  Multiplanar, multisequence MR imaging of the head obtained  prior to, and after, intravenous contrast administration.    Head MRA: 3D time-of-flight MRA of the [place] [facility] [place] was performed  without intravenous contrast.  Neck MRA:  Limited non contrast 2DTOF images were obtained of the  mid-cervical region. Following intravenous gadolinium-based contrast  administration, a contrast enhanced MRA of the neck/cervical vessels  was performed.  Three-dimensional reconstructions of the neck and head MRA were  created, which were reviewed by the radiologist.    MEDS/CONTRAST: 19 ML DOTAREM    COMPARISON: No relevant prior comparison available for review     FINDINGS:    14 x 11 mm mass in the left in the sella. Demonstrates heterogeneous  T2 isointense signal and homogeneous T1 isointense signal.  Infundibulum is midline. Optic chiasm is unremarkable. No abnormal  enhancement elsewhere intracranially.    There is no midline shift or evidence of acute intracranial  hemorrhage. Diffusion weighted images demonstrate no evidence of acute  infarction. The ventricles are proportionate to the cerebral sulci.  Scattered foci of T2 hyperintense FLAIR signal in the periventricular  and subcortical white matter, which is nonspecific, likely related to  chronic small vessel ischemic disease given the patient's age. Normal  parenchymal volume for age. Normal major intracranial vascular flow  voids.    No suspicious abnormality of the skull marrow signal.  No paranasal  sinus mucosal thickening. Mastoid air cells are clear. The orbits are  unremarkable.    MRA HEAD:  No aneurysm or occlusion of the major intracranial arteries. No  vascular malformation. The petrous, cavernous, and supraclinoid  internal carotid arteries are within normal limits. The PORSHA and MCA  vessels are within normal limits. The anterior communicating artery is  within normal limits.     The posterior communicating arteries are patent bilaterally.     The vertebral arteries and basilar artery are within normal limits.     MRA NECK:  A 3 vessel aortic arch is present. The arch vessels are  patent.  Common carotid arteries have preserved calibers.     The carotid bifurcations are widely patent.     The external carotid arteries are grossly patent. The distal cervical  internal carotid arteries are patent without evidence of elastic  recoil.    The vertebral arteries arise from their respective subclavian  arteries. The cervical vertebral arteries appear patent, without  evidence of flow-limiting stenosis, dissection or occlusion.      Impression    IMPRESSION:  1. No evidence of acute infarction or intracranial hemorrhage.  2. 14 mm mass in the sella, suspicious for pituitary macroadenoma.  Recommend clinical correlation and short-term follow-up MR pituitary  gland to better characterize.  3. Head MRA demonstrates patent major intracranial vasculature, no  intracranial aneurysms.  4. Neck MRA demonstrates patent major cervical vasculature without  significant stenosis.    NOHELIA MOHAN MD         SYSTEM ID:  [id number]   MRA Neck (Carotids) wo & w Contrast    Narrative    EXAM: MRA BRAIN ([place] [facility] [place]) W/O CONTRAST, MRA NECK (CAROTIDS)  W/O & W CONTRAST 1/23/2025 3:52 PM    HISTORY: Acute neuro deficit, stroke suspected, Amaurosis fugax.    TECHNIQUE:    Brain MRI:  Multiplanar, multisequence MR imaging of the head obtained  prior to, and after, intravenous contrast administration.    Head MRA: 3D  time-of-flight MRA of the [place] [facility] [place] was performed  without intravenous contrast.  Neck MRA:  Limited non contrast 2DTOF images were obtained of the  mid-cervical region. Following intravenous gadolinium-based contrast  administration, a contrast enhanced MRA of the neck/cervical vessels  was performed.  Three-dimensional reconstructions of the neck and head MRA were  created, which were reviewed by the radiologist.    MEDS/CONTRAST: 19 ML DOTAREM    COMPARISON: No relevant prior comparison available for review     FINDINGS:    14 x 11 mm mass in the left in the sella. Demonstrates heterogeneous  T2 isointense signal and homogeneous T1 isointense signal.  Infundibulum is midline. Optic chiasm is unremarkable. No abnormal  enhancement elsewhere intracranially.    There is no midline shift or evidence of acute intracranial  hemorrhage. Diffusion weighted images demonstrate no evidence of acute  infarction. The ventricles are proportionate to the cerebral sulci.  Scattered foci of T2 hyperintense FLAIR signal in the periventricular  and subcortical white matter, which is nonspecific, likely related to  chronic small vessel ischemic disease given the patient's age. Normal  parenchymal volume for age. Normal major intracranial vascular flow  voids.    No suspicious abnormality of the skull marrow signal. No paranasal  sinus mucosal thickening. Mastoid air cells are clear. The orbits are  unremarkable.    MRA HEAD:  No aneurysm or occlusion of the major intracranial arteries. No  vascular malformation. The petrous, cavernous, and supraclinoid  internal carotid arteries are within normal limits. The PORSHA and MCA  vessels are within normal limits. The anterior communicating artery is  within normal limits.     The posterior communicating arteries are patent bilaterally.     The vertebral arteries and basilar artery are within normal limits.     MRA NECK:  A 3 vessel aortic arch is present. The arch vessels are  patent.   Common carotid arteries have preserved calibers.     The carotid bifurcations are widely patent.     The external carotid arteries are grossly patent. The distal cervical  internal carotid arteries are patent without evidence of elastic  recoil.    The vertebral arteries arise from their respective subclavian  arteries. The cervical vertebral arteries appear patent, without  evidence of flow-limiting stenosis, dissection or occlusion.      Impression    IMPRESSION:  1. No evidence of acute infarction or intracranial hemorrhage.  2. 14 mm mass in the sella, suspicious for pituitary macroadenoma.  Recommend clinical correlation and short-term follow-up MR pituitary  gland to better characterize.  3. Head MRA demonstrates patent major intracranial vasculature, no  intracranial aneurysms.  4. Neck MRA demonstrates patent major cervical vasculature without  significant stenosis.    NOHELIA MOHAN MD         SYSTEM ID:  [id number]   MR Brain w/o & w Contrast    Narrative    EXAM: MR BRAIN W/O & W CONTRAST    HISTORY: Acute neuro deficit, stroke suspected, Amaurosis fugax.    TECHNIQUE: Multiplanar, multisequence MR imaging of the head obtained  prior to, and after, intravenous contrast administration    MEDS/CONTRAST: 19 ML DOTAREM    COMPARISON: No relevant prior comparison available for review     FINDINGS:    14 x 11 mm mass in the left in the sella. Demonstrates heterogeneous  T2 isointense signal and homogeneous T1 isointense signal.  Infundibulum is midline. Optic chiasm is unremarkable. No abnormal  enhancement elsewhere intracranially.    There is no midline shift or evidence of acute intracranial  hemorrhage. Diffusion weighted images demonstrate no evidence of acute  infarction. The ventricles are proportionate to the cerebral sulci.  Scattered foci of T2 hyperintense FLAIR signal in the periventricular  and subcortical white matter, which is nonspecific, likely related to  chronic small vessel ischemic disease  given the patient's age. Normal  parenchymal volume for age. Normal major intracranial vascular flow  voids.    No suspicious abnormality of the skull marrow signal. No paranasal  sinus mucosal thickening. Mastoid air cells are clear. The orbits are  unremarkable.    MRA HEAD:  No aneurysm or occlusion of the major intracranial arteries. No  vascular malformation. The petrous, cavernous, and supraclinoid  internal carotid arteries are within normal limits. The PORSHA and MCA  vessels are within normal limits. The anterior communicating artery is  within normal limits.     The posterior communicating arteries are patent bilaterally.     The vertebral arteries and basilar artery are within normal limits.     MRA NECK:  A 3 vessel aortic arch is present. The arch vessels are  patent.  Common carotid arteries have preserved calibers.     The carotid bifurcations are widely patent.     The external carotid arteries are grossly patent. The distal cervical  internal carotid arteries are patent without evidence of elastic  recoil.    The vertebral arteries arise from their respective subclavian  arteries. The cervical vertebral arteries appear patent, without  evidence of flow-limiting stenosis, dissection or occlusion.      Impression    IMPRESSION:  1. No evidence of acute infarction or intracranial hemorrhage.  2. 14 mm mass in the sella, suspicious for pituitary macroadenoma.  Recommend clinical correlation and short-term follow-up MR pituitary  gland to better characterize.  3. Head MRA demonstrates patent major intracranial vasculature, no  intracranial aneurysms.  4. Neck MRA demonstrates patent major cervical vasculature without  significant stenosis.    NOHELIA MOHAN MD         SYSTEM ID:  [id number]       Medications   gadoterate meglumine (DOTAREM) injection 20 mL (19 mLs Intravenous $Given 1/23/25 9708)       Assessments & Plan (with Medical Decision Making)   Pt nontoxic in NAD.     He is in atrial fibrillation, rate  controlled.     No neurological deficits in the ED. He is asymptomatic.     Urgent stroke consult placed. They recommend MRI studies.     MRI:  1. No evidence of acute infarction or intracranial hemorrhage.  2. 14 mm mass in the sella, suspicious for pituitary macroadenoma.  Recommend clinical correlation and short-term follow-up MR pituitary  gland to better characterize.  3. Head MRA demonstrates patent major intracranial vasculature, no  intracranial aneurysms.  4. Neck MRA demonstrates patent major cervical vasculature without  significant stenosis.    I spoke with stroke neurology again. They think less likely his episode yesterday was due to stroke/TIA. However, with his finding of pituitary macroadenoma they placed a neurosurgery follow up. It is possible this adenoma could be contributing to the episode he had. They also place a ophthalmology referral.     His chadsvasc score is >2 and anticoagulation is recommended. He declines at this time. We discuss increased risk of having a stroke. We attempted a test script to pharmacy and still the price of his medication would be high for him.  He would like to wait until following up with his PCP to discuss further.  I will also place a cardiology referral for him.    Strict return precautions are given to the pt, they will return if symptoms are worsening or concerning. The pt understands and agrees with the plan and they are discharged.     Wicho Coleman PA-C                  I have reviewed the nursing notes.    I have reviewed the findings, diagnosis, plan and need for follow up with the patient.          Discharge Medication List as of 1/23/2025  5:06 PM        START taking these medications    Details   apixaban ANTICOAGULANT (ELIQUIS ANTICOAGULANT) 5 MG tablet Take 1 tablet (5 mg) by mouth 2 times daily., Disp-180 tablet, R-0, E-Prescribe             Final diagnoses:   Sudden loss of vision   Pituitary macroadenoma (H)   Atrial fibrillation (H)        1/23/2025   [facility]       Wicho Coleman PA  01/23/25 2029

## 2025-01-23 NOTE — ED TRIAGE NOTES
"ED Nursing Triage Note (General)   ________________________________    Duane M Geisler is a 74 year old Male that presents to triage via private vehicle with his wife for complaints of recent vision changes.  Patient states yesterday when driving his eyes suddenly went out of focus and states, \"then it was like a bright light and then it went black\".  Patient states this caused patient to go into the ditch, however, states while going into the ditch his vision suddenly returned and states there was no impact to the vehicle and states he was able to pull out of the ditch.  Patient denies any sx precipitating incident and states, \"I felt totally normal\".  Patient denies hx of stroke, TIA, or any dx of eye problems. Patient denies recent falls or trauma.   Significant symptoms had onset 24 hour(s) ago.  Vital signs:  Temp: 97.1  F (36.2  C) Temp src: Tympanic BP: 129/87 Pulse: 86   Resp: 20 SpO2: 98 %     Height: 182.9 cm (6') Weight: 93 kg (205 lb)  Estimated body mass index is 27.8 kg/m  as calculated from the following:    Height as of this encounter: 1.829 m (6').    Weight as of this encounter: 93 kg (205 lb).  PRE HOSPITAL PRIOR LIVING SITUATION Spouse      Triage Assessment (Adult)       Row Name 01/23/25 1022          Triage Assessment    Airway WDL WDL        Respiratory WDL    Respiratory WDL WDL        Skin Circulation/Temperature WDL    Skin Circulation/Temperature WDL WDL        Cardiac WDL    Cardiac WDL WDL     Cardiac Rhythm NSR        Peripheral/Neurovascular WDL    Peripheral Neurovascular WDL WDL        Cognitive/Neuro/Behavioral WDL    Cognitive/Neuro/Behavioral WDL WDL                     "

## 2025-01-27 NOTE — CONFIDENTIAL NOTE
NEUROSURGERY - NEW PREVISIT PLANNING    Referring Provider:   Maribel Blancas PA-C      [facility] ED 1/23/2025   Reason For Visit: D35.2 (ICD-10-CM) - Pituitary macroadenoma (H)   E23.6 (ICD-10-CM) - Pituitary mass   Additional Information:  Pituitary mass brain tumor/mass pituitary macroadenoma, transient episode of bilateral complete vision loss       IMAGING STATUS/LOCATION DATE/TYPE   MRI PACS 01/23/2025  Brain  [facility]   CT N/A    XRAY N/A    NOTES STATUS/LOCATION DATE/TYPE   Other specialist [facility]: N/A    EMG N/A    INJECTION N/A    PHYSICAL THERAPY N/A    SURGERY N/A

## 2025-02-03 ENCOUNTER — LAB (OUTPATIENT)
Dept: LAB | Facility: CLINIC | Age: 75
End: 2025-02-03
Payer: MEDICARE

## 2025-02-03 ENCOUNTER — PRE VISIT (OUTPATIENT)
Dept: NEUROSURGERY | Facility: CLINIC | Age: 75
End: 2025-02-03

## 2025-02-03 ENCOUNTER — OFFICE VISIT (OUTPATIENT)
Dept: NEUROSURGERY | Facility: CLINIC | Age: 75
End: 2025-02-03
Attending: PHYSICIAN ASSISTANT
Payer: COMMERCIAL

## 2025-02-03 VITALS — OXYGEN SATURATION: 98 % | DIASTOLIC BLOOD PRESSURE: 73 MMHG | SYSTOLIC BLOOD PRESSURE: 105 MMHG | HEART RATE: 80 BPM

## 2025-02-03 DIAGNOSIS — D35.3 BENIGN NEOPLASM OF PITUITARY GLAND AND CRANIOPHARYNGEAL DUCT (POUCH) (H): ICD-10-CM

## 2025-02-03 DIAGNOSIS — D49.7 PITUITARY TUMOR: ICD-10-CM

## 2025-02-03 DIAGNOSIS — D35.2 BENIGN NEOPLASM OF PITUITARY GLAND AND CRANIOPHARYNGEAL DUCT (POUCH) (H): ICD-10-CM

## 2025-02-03 DIAGNOSIS — D49.7 PITUITARY TUMOR: Primary | ICD-10-CM

## 2025-02-03 LAB
ANION GAP SERPL CALCULATED.3IONS-SCNC: 7 MMOL/L (ref 7–15)
BUN SERPL-MCNC: 16.1 MG/DL (ref 8–23)
CALCIUM SERPL-MCNC: 9.2 MG/DL (ref 8.8–10.4)
CHLORIDE SERPL-SCNC: 106 MMOL/L (ref 98–107)
CORTIS SERPL-MCNC: 7.4 UG/DL
CORTIS SERPL-MCNC: 7.6 UG/DL
CREAT SERPL-MCNC: 1.09 MG/DL (ref 0.67–1.17)
EGFRCR SERPLBLD CKD-EPI 2021: 70 ML/MIN/1.73M2
FSH SERPL IRP2-ACNC: 3 MIU/ML (ref 1.5–12.4)
GLUCOSE SERPL-MCNC: 92 MG/DL (ref 70–99)
HCO3 SERPL-SCNC: 27 MMOL/L (ref 22–29)
LH SERPL-ACNC: 1.3 MIU/ML (ref 1.7–8.6)
POTASSIUM SERPL-SCNC: 4.2 MMOL/L (ref 3.4–5.3)
PROLACTIN SERPL 3RD IS-MCNC: 270 NG/ML (ref 4–15)
SODIUM SERPL-SCNC: 140 MMOL/L (ref 135–145)
T4 FREE SERPL-MCNC: 1.12 NG/DL (ref 0.9–1.7)
TSH SERPL DL<=0.005 MIU/L-ACNC: 2 UIU/ML (ref 0.3–4.2)

## 2025-02-03 PROCEDURE — 82533 TOTAL CORTISOL: CPT

## 2025-02-03 PROCEDURE — 82397 CHEMILUMINESCENT ASSAY: CPT

## 2025-02-03 PROCEDURE — 84439 ASSAY OF FREE THYROXINE: CPT

## 2025-02-03 PROCEDURE — 99203 OFFICE O/P NEW LOW 30 MIN: CPT | Performed by: NEUROLOGICAL SURGERY

## 2025-02-03 PROCEDURE — 84403 ASSAY OF TOTAL TESTOSTERONE: CPT

## 2025-02-03 PROCEDURE — 84443 ASSAY THYROID STIM HORMONE: CPT

## 2025-02-03 PROCEDURE — 84305 ASSAY OF SOMATOMEDIN: CPT

## 2025-02-03 PROCEDURE — 82024 ASSAY OF ACTH: CPT

## 2025-02-03 PROCEDURE — 84146 ASSAY OF PROLACTIN: CPT

## 2025-02-03 PROCEDURE — 80048 BASIC METABOLIC PNL TOTAL CA: CPT

## 2025-02-03 PROCEDURE — 83002 ASSAY OF GONADOTROPIN (LH): CPT

## 2025-02-03 PROCEDURE — 83003 ASSAY GROWTH HORMONE (HGH): CPT

## 2025-02-03 PROCEDURE — 36415 COLL VENOUS BLD VENIPUNCTURE: CPT

## 2025-02-03 PROCEDURE — 83001 ASSAY OF GONADOTROPIN (FSH): CPT

## 2025-02-03 NOTE — NURSING NOTE
Duane M Geisler is a 74 year old male who presents for:  Chief Complaint   Patient presents with    Consult     No symptoms of concern - surgical consult for pituitary mass        Initial Vitals:  /73   Pulse 80   SpO2 98%  Estimated body mass index is 27.8 kg/m  as calculated from the following:    Height as of 1/23/25: 1.829 m (6').    Weight as of 1/23/25: 93 kg (205 lb).. There is no height or weight on file to calculate BSA. BP completed using cuff size: large  Data Unavailable    Nursing Comments:     Fausto Coburn

## 2025-02-03 NOTE — PROGRESS NOTES
I was asked by Dr. Blancas to see this patient in consultation    74 year old male with 1.4 cm pituitary adenoma.  A couple weeks ago, he was driving when he noticed a light sensation in the eyes, followed by transient loss of vision, which then returned when he found himself in the ditch.  He notes that he had not slept well the night before, and was awake early driving his wife to a dentist appointment.  No similar issues in the past.  Vision back to baseline since then.  Underwent MR brain, personally reviewed, with 1.4 cm pituitary adenoma abutting the optic chiasm.       Past Medical History:   Diagnosis Date    Other specified postprocedural states     12/2002,normal f/u 10 years    Personal history of other medical treatment (CODE)     5/2003, egd for food obstruction    Tubulo-interstitial nephritis     1985     Past Surgical History:   Procedure Laterality Date    COLONOSCOPY      12/2002, normal f/u 10 years    COLONOSCOPY  03/05/2014    3/2014,Moderate diverticulosis - follow up 10 years    ESOPHAGOSCOPY, GASTROSCOPY, DUODENOSCOPY (EGD), COMBINED      5/2003, food obstruction         Physical Exam  /73   Pulse 80   SpO2 98%     Mental status:  Alert and Oriented x 3, speech is fluent.  HEENT:  PERRL.  EOM s intact.  Visual fields full to gross exam  Cranial nerves:  II-XII intact.   Motor:    Shoulder Abduction:  Right:  5/5   Left:  5/5  Biceps:                      Right:  5/5   Left:  5/5  Triceps:                     Right:  5/5   Left:  5/5  Interosseus :             Right:  5/5   Left:  5/5  Hip Flexion:               Right: 5/5  Left:  5/5  Quadriceps:              Right:  5/5  Left:  5/5  Hamstrings:              Right:  5/5  Left:  5/5  Gastroc Soleus:        Right:  5/5  Left:  5/5  Tib/Ant:                      Right:  5/5  Left:  5/5  EHL:                          Right:  5/5  Left:  5/5  Sensation:  Intact  Reflexes:  Negative Medeiros's.  Smooth tandem walking.      A/P:  74 year old  male with 1.4 cm pituitary adenoma    I had a discussion with the patient, reviewing the history, symptoms, and imaging  We will order an endocrine panel  Will refer for neuro-ophthalmology eval  Appreciate ophthalmology perspective on this visual episode  If Optho exam and visual field testing are normal as well as the endocrine panel, we discussed the options of surgical resection of the tumor given the proximity to the chiasm versus surveillance  We will plan for virtual appointment after workup complete

## 2025-02-03 NOTE — PATIENT INSTRUCTIONS
Patient Next Steps:      Order placed for Adult Eye Referral (Neuro-Ophthalmology).  You can call the phone number highlighted in the order to schedule your appointment.   [facility] will call you to coordinate your care as prescribed by your provider. If you don't hear from a representative within 2 business days, please call [phone].    Order placed for labs. You can call [phone]        Please call us if you have any further questions or concerns.    [facility]   Phone: [phone]  Fax: [phone]

## 2025-02-03 NOTE — LETTER
2/3/2025      Duane M Geisler  [id number]  [place] [address]      Dear Colleague,    Thank you for referring your patient, Duane M Geisler, to the [facility]. Please see a copy of my visit note below.    I was asked by Dr. Blancas to see this patient in consultation    74 year old male with 1.4 cm pituitary adenoma.  A couple weeks ago, he was driving when he noticed a light sensation in the eyes, followed by transient loss of vision, which then returned when he found himself in the ditch.  He notes that he had not slept well the night before, and was awake early driving his wife to a dentist appointment.  No similar issues in the past.  Vision back to baseline since then.  Underwent MR brain, personally reviewed, with 1.4 cm pituitary adenoma abutting the optic chiasm.       Past Medical History:   Diagnosis Date     Other specified postprocedural states     12/2002,normal f/u 10 years     Personal history of other medical treatment (CODE)     5/2003, egd for food obstruction     Tubulo-interstitial nephritis     1985     Past Surgical History:   Procedure Laterality Date     COLONOSCOPY      12/2002, normal f/u 10 years     COLONOSCOPY  03/05/2014    3/2014,Moderate diverticulosis - follow up 10 years     ESOPHAGOSCOPY, GASTROSCOPY, DUODENOSCOPY (EGD), COMBINED      5/2003, food obstruction         Physical Exam  /73   Pulse 80   SpO2 98%     Mental status:  Alert and Oriented x 3, speech is fluent.  HEENT:  PERRL.  EOM s intact.  Visual fields full to gross exam  Cranial nerves:  II-XII intact.   Motor:    Shoulder Abduction:  Right:  5/5   Left:  5/5  Biceps:                      Right:  5/5   Left:  5/5  Triceps:                     Right:  5/5   Left:  5/5  Interosseus :             Right:  5/5   Left:  5/5  Hip Flexion:               Right: 5/5  Left:  5/5  Quadriceps:              Right:  5/5  Left:  5/5  Hamstrings:              Right:  5/5  Left:   5/5  Gastroc Soleus:        Right:  5/5  Left:  5/5  Tib/Ant:                      Right:  5/5  Left:  5/5  EHL:                          Right:  5/5  Left:  5/5  Sensation:  Intact  Reflexes:  Negative Medeiros's.  Smooth tandem walking.      A/P:  74 year old male with 1.4 cm pituitary adenoma    I had a discussion with the patient, reviewing the history, symptoms, and imaging  We will order an endocrine panel  Will refer for neuro-ophthalmology eval  Appreciate ophthalmology perspective on this visual episode  If Optho exam and visual field testing are normal as well as the endocrine panel, we discussed the options of surgical resection of the tumor given the proximity to the chiasm versus surveillance  We will plan for virtual appointment after workup complete          Again, thank you for allowing me to participate in the care of your patient.        Sincerely,        Brian Avilez MD    Electronically signed

## 2025-02-04 ENCOUNTER — MYC MEDICAL ADVICE (OUTPATIENT)
Dept: NEUROSURGERY | Facility: CLINIC | Age: 75
End: 2025-02-04
Payer: COMMERCIAL

## 2025-02-04 LAB
ACTH PLAS-MCNC: 15 PG/ML
GH SERPL-MCNC: 0.7 UG/L
IGF-I BLD-MCNC: 144 NG/ML (ref 22–212)

## 2025-02-04 NOTE — TELEPHONE ENCOUNTER
Dr. Avilez reviewed pts endocrine panel: Let's schedule a virtual follow up with me after the Neuro-Optho appointment.    Pt updated to call and schedule appt with neuro-optho and then schedule virtual appt with Dr. Avilez after that.

## 2025-02-06 LAB — A-PGH SER-MCNC: 0.2 NG/ML

## 2025-02-07 LAB — TESTOST SERPL-MCNC: 194 NG/DL (ref 240–950)

## 2025-02-27 DIAGNOSIS — I48.3 TYPICAL ATRIAL FLUTTER (H): ICD-10-CM

## 2025-02-27 NOTE — TELEPHONE ENCOUNTER
Patient is out      Estela Ellington on 2/27/2025 at 8:31 AM     Problem: Safety  Goal: Will remain free from falls  Outcome: PROGRESSING AS EXPECTED      Problem: Infection  Goal: Will remain free from infection    Intervention: Implement standard precautions and perform hand washing before and after patient contact  Standard infection prevention protocols in place.

## 2025-03-03 DIAGNOSIS — E78.2 MIXED HYPERLIPIDEMIA: ICD-10-CM

## 2025-03-03 RX ORDER — METOPROLOL TARTRATE 50 MG
50 TABLET ORAL 2 TIMES DAILY
Qty: 180 TABLET | Refills: 0 | Status: SHIPPED | OUTPATIENT
Start: 2025-03-03

## 2025-03-03 NOTE — TELEPHONE ENCOUNTER
[facility] sent Rx request for the following:      Requested Prescriptions   Pending Prescriptions Disp Refills    metoprolol tartrate (LOPRESSOR) 50 MG tablet [Pharmacy Med Name: Metoprolol Tartrate 50 MG Oral Tablet] 180 tablet 0     Sig: Take 1 tablet by mouth twice daily       Beta-Blockers Protocol Passed - 3/3/2025  4:51 PM     Last Prescription Date:   02/15/24  Last Fill Qty/Refills:         180, R-3    Last Office Visit:              02/15/24 (Micky)   Future Office visit:             Next 5 appointments (look out 90 days)      Apr 15, 2025 1:40 PM  (Arrive by 1:25 PM)  Annual Wellness Visit with Bryant Weeks MD,  WELLNESS NURSE  [facility] ([facility]) [address]  [place] [address] [phone]           Prescription refilled per RN Medication Refill Policy.................... Yesy Gaviria RN ....................  3/3/2025   4:53 PM

## 2025-03-04 RX ORDER — ATORVASTATIN CALCIUM 10 MG/1
10 TABLET, FILM COATED ORAL DAILY
Qty: 30 TABLET | Refills: 0 | Status: SHIPPED | OUTPATIENT
Start: 2025-03-04

## 2025-03-04 NOTE — TELEPHONE ENCOUNTER
[facility] sent Rx request for the following:      Requested Prescriptions   Pending Prescriptions Disp Refills    atorvastatin (LIPITOR) 10 MG tablet [Pharmacy Med Name: Atorvastatin Calcium 10 MG Oral Tablet] 90 tablet 0     Sig: Take 1 tablet by mouth once daily       Antihyperlipidemic agents Failed - 3/4/2025  3:23 PM        Failed - LDL on file in the past 12 months     Last Prescription Date:   2/15/24  Last Fill Qty/Refills:         90, R-4    Last Office Visit:              2/15/24 (px)   Future Office visit:             Next 5 appointments (look out 90 days)      Apr 15, 2025 1:40 PM  (Arrive by 1:25 PM)  Annual Wellness Visit with Bryant Weeks MD,  WELLNESS NURSE  [facility] ([facility]) [address]  [place] [id number] [phone]         Unable to complete prescription refill per RN Medication Refill Policy.     Noel Camacho RN on 3/4/2025 at 3:23 PM

## 2025-03-12 ENCOUNTER — TELEPHONE (OUTPATIENT)
Dept: PEDIATRICS | Facility: OTHER | Age: 75
End: 2025-03-12
Payer: COMMERCIAL

## 2025-03-12 NOTE — TELEPHONE ENCOUNTER
Pt called, stating he is out of atorvastatin. Relayed to call center, and then to Pt, that prescription was sent in 3/4/25. Genesis Moon RN .............. 3/12/2025  12:12 PM

## 2025-03-19 ENCOUNTER — TELEPHONE (OUTPATIENT)
Dept: PEDIATRICS | Facility: OTHER | Age: 75
End: 2025-03-19
Payer: COMMERCIAL

## 2025-03-19 DIAGNOSIS — R39.9 LOWER URINARY TRACT SYMPTOMS (LUTS): Primary | ICD-10-CM

## 2025-03-19 NOTE — TELEPHONE ENCOUNTER
Reason for call: Request for a referral.    Referral requested for what concern?  Personal concern - urology    Have you already been seen by the specialty you need the referral for?  YES - about 5 yrs ago, [facility]    Where do you want to go?   [facility]    Preferred method for responding to this message: Telephone Call    Phone number patient can be reached at? Cell number on file:    Telephone Information:   Mobile [phone]       If we can't reach you directly, may we leave a detailed response at the number you provided? Yes      The patient requests a call back regarding the referral.      Kayleen Yeung on 3/19/2025 at 4:12 PM

## 2025-03-19 NOTE — TELEPHONE ENCOUNTER
Dx incontinence and ED issues.   Referral xenia'd up   Lisa Hampton LPN (Ext [phone] ) ..........3/19/2025 4:19 PM

## 2025-03-24 NOTE — TELEPHONE ENCOUNTER
Spoke with patient.  Informed him Dr. Weeks is out until 3/31.  Patient states this request can wait for Dr. Weeks's return.  Paola Miller RN on 3/24/2025 at 10:29 AM

## 2025-04-12 DIAGNOSIS — E78.2 MIXED HYPERLIPIDEMIA: ICD-10-CM

## 2025-04-13 SDOH — HEALTH STABILITY: PHYSICAL HEALTH: ON AVERAGE, HOW MANY MINUTES DO YOU ENGAGE IN EXERCISE AT THIS LEVEL?: 30 MIN

## 2025-04-13 SDOH — HEALTH STABILITY: PHYSICAL HEALTH: ON AVERAGE, HOW MANY DAYS PER WEEK DO YOU ENGAGE IN MODERATE TO STRENUOUS EXERCISE (LIKE A BRISK WALK)?: 2 DAYS

## 2025-04-13 ASSESSMENT — SOCIAL DETERMINANTS OF HEALTH (SDOH): HOW OFTEN DO YOU GET TOGETHER WITH FRIENDS OR RELATIVES?: ONCE A WEEK

## 2025-04-15 ENCOUNTER — OFFICE VISIT (OUTPATIENT)
Dept: PEDIATRICS | Facility: OTHER | Age: 75
End: 2025-04-15
Attending: INTERNAL MEDICINE
Payer: COMMERCIAL

## 2025-04-15 VITALS
HEART RATE: 85 BPM | HEIGHT: 72 IN | BODY MASS INDEX: 28 KG/M2 | SYSTOLIC BLOOD PRESSURE: 118 MMHG | RESPIRATION RATE: 14 BRPM | OXYGEN SATURATION: 97 % | DIASTOLIC BLOOD PRESSURE: 84 MMHG | WEIGHT: 206.7 LBS | TEMPERATURE: 96.3 F

## 2025-04-15 DIAGNOSIS — I48.3 TYPICAL ATRIAL FLUTTER (H): ICD-10-CM

## 2025-04-15 DIAGNOSIS — L50.8 CHRONIC URTICARIA: ICD-10-CM

## 2025-04-15 DIAGNOSIS — I48.11 LONGSTANDING PERSISTENT ATRIAL FIBRILLATION (H): ICD-10-CM

## 2025-04-15 DIAGNOSIS — Z12.5 SCREENING FOR PROSTATE CANCER: ICD-10-CM

## 2025-04-15 DIAGNOSIS — R32 URINARY INCONTINENCE, UNSPECIFIED TYPE: ICD-10-CM

## 2025-04-15 DIAGNOSIS — N52.9 ERECTILE DYSFUNCTION, UNSPECIFIED ERECTILE DYSFUNCTION TYPE: ICD-10-CM

## 2025-04-15 DIAGNOSIS — E78.2 MIXED HYPERLIPIDEMIA: ICD-10-CM

## 2025-04-15 DIAGNOSIS — Z71.85 VACCINE COUNSELING: ICD-10-CM

## 2025-04-15 DIAGNOSIS — R73.03 PRE-DIABETES: ICD-10-CM

## 2025-04-15 DIAGNOSIS — Z00.00 ENCOUNTER FOR MEDICARE ANNUAL WELLNESS EXAM: Primary | ICD-10-CM

## 2025-04-15 RX ORDER — METOPROLOL TARTRATE 50 MG
50 TABLET ORAL 2 TIMES DAILY
Qty: 180 TABLET | Refills: 4 | Status: SHIPPED | OUTPATIENT
Start: 2025-04-15

## 2025-04-15 RX ORDER — ATORVASTATIN CALCIUM 10 MG/1
10 TABLET, FILM COATED ORAL DAILY
Qty: 90 TABLET | Refills: 4 | Status: SHIPPED | OUTPATIENT
Start: 2025-04-15

## 2025-04-15 RX ORDER — ATORVASTATIN CALCIUM 10 MG/1
10 TABLET, FILM COATED ORAL DAILY
Qty: 30 TABLET | Refills: 0 | OUTPATIENT
Start: 2025-04-15

## 2025-04-15 ASSESSMENT — PAIN SCALES - GENERAL: PAINLEVEL_OUTOF10: NO PAIN (0)

## 2025-04-15 NOTE — PROGRESS NOTES
Assessment & Plan   1. Encounter for Medicare annual wellness exam (Primary)    2. Mixed hyperlipidemia  Statin due for lipid panel.  Prefers to come back fasting.  - Lipid Profile; Future  - atorvastatin (LIPITOR) 10 MG tablet; Take 1 tablet (10 mg) by mouth daily.  Dispense: 90 tablet; Refill: 4    3. Longstanding persistent atrial fibrillation (H)  Was  documented to be back in atrial fibrillation had an emergency room visit a couple of months ago.  He has not felt any palpitations.  Cardiac auscultation today consistent with atrial fibrillation.  We had a lengthy discussion today with regards to risk factors regarding stroke risk reduction.  Given his [id number] score of 2 I recommend initiation of anticoagulation.  We discussed the risks and benefits of warfarin, DOACs and decided to send a referral for apixaban again.  Adverse effects were discussed including intracranial hemorrhage and GI bleeding.  - apixaban ANTICOAGULANT (ELIQUIS) 5 MG tablet; Take 1 tablet (5 mg) by mouth 2 times daily.  Dispense: 180 tablet; Refill: 4    4. Typical atrial flutter (H)  - metoprolol tartrate (LOPRESSOR) 50 MG tablet; Take 1 tablet (50 mg) by mouth 2 times daily.  Dispense: 180 tablet; Refill: 4  - apixaban ANTICOAGULANT (ELIQUIS) 5 MG tablet; Take 1 tablet (5 mg) by mouth 2 times daily.  Dispense: 180 tablet; Refill: 4    5. Screening for prostate cancer  PSA not recommended due to age and family history.  He will be seeing Dr. Suazo of urology in the near future and he can ask him his opinion as well    6. Vaccine counseling  - Tdap, tetanus-diptheria-acell pertussis, (BOOSTRIX) 5-2.5-18.5 LF-MCG/0.5 LIA injection; Inject 0.5 mLs into the muscle once for 1 dose.  Dispense: 0.5 mL; Refill: 0    7. Chronic urticaria  Has really benefited from Xolair.  He is following with an allergist.    8. Urinary incontinence, unspecified type  9. Erectile dysfunction, unspecified erectile dysfunction type  Upcoming consultation with  "Dr. Suazo of urology    10. Pre-diabetes  Due for repeat A1c  - Basic metabolic panel; Future  - Hemoglobin A1c; Future           Return in about 1 year (around 4/15/2026), or if symptoms worsen or fail to improve, for med management, medicare wellness visit.    Signed, Bryant Weeks MD, FAAP, FACP  Internal Medicine & Pediatrics    Subjective   Duane M Geisler is a 74 year old male who presents for Medicare Visit, med management.    Objective   Vitals: /84   Pulse 85   Temp (!) 96.3  F (35.7  C)   Resp 14   Ht 1.822 m (5' 11.75\")   Wt 93.8 kg (206 lb 11.2 oz)   SpO2 97%   BMI 28.23 kg/m      Gen: Alert, NAD.  Neck: No carotid bruits  CV: irregularly irregular no m/r/g  Pulm: CTAB, no w/r/r. No increased work of breathing  Msk: No LE edema  Neuro: Grossly intact  Skin: No concerning lesions.  Psychiatric: Normal affect and insight. Does not appear anxious or depressed.      Review and Analysis of Data   I personally reviewed the following:  External notes: No  Results: Yes lab, ekg  Use of an independent historian: No  Independent review of a test performed by another physician: No  Discussion of management with another physician: No  Moderate risk of morbidity from additional diagnostic testing and/or treatment.    "

## 2025-04-15 NOTE — PROGRESS NOTES
"Preventive Care Visit  [facility]  Bryant Weeks MD, Internal Medicine  Apr 15, 2025      Encounter for Medicare annual wellness exam    -Colon cancer screening done via Cologuard on 3/13/24 (impression: negative).     -PSA lab work performed 2/15/24 (value of 0.83 ng/mL).     -Immunizations: Patient is due for the following: Tdap.    -Derm: Does patient regularly see dermatologist? No.     -Refills pended for requested medications.  -Labs pended.    BMI  Estimated body mass index is 28.23 kg/m  as calculated from the following:    Height as of this encounter: 1.822 m (5' 11.75\").    Weight as of this encounter: 93.8 kg (206 lb 11.2 oz).     Counseling  Appropriate preventive services were addressed with this patient via screening, questionnaire, or discussion as appropriate for fall prevention, nutrition, physical activity, Tobacco-use cessation, social engagement, weight loss and cognition.  Checklist reviewing preventive services available has been given to the patient.  Reviewed patient's diet, addressing concerns and/or questions.   He is at risk for lack of exercise and has been provided with information to increase physical activity for the benefit of his well-being.   He is at risk for psychosocial distress and has been provided with information to reduce risk.   The patient was provided with written information regarding signs of hearing loss.   Information on urinary incontinence and treatment options given to patient.     Work on weight loss  Regular exercise    No follow-ups on file.    Subjective Duane is a 74 year old, presenting for the following:  Medicare Visit        4/15/2025     1:50 PM   Additional Questions   Roomed by ZOEY Medeiros         Health Care Directive  Patient does not have a Health Care Directive: Discussed advance care planning with patient; however, patient declined at this time.        4/13/2025   General Health   How would you rate your overall " physical health? Good   Feel stress (tense, anxious, or unable to sleep) To some extent   (!) STRESS CONCERN      4/13/2025   Nutrition   Diet: Regular (no restrictions)         4/13/2025   Exercise   Days per week of moderate/strenous exercise 2 days   Average minutes spent exercising at this level 30 min   (!) EXERCISE CONCERN      4/13/2025   Social Factors   Frequency of gathering with friends or relatives Once a week   Worry food won't last until get money to buy more No   Food not last or not have enough money for food? Yes   Do you have housing? (Housing is defined as stable permanent housing and does not include staying ouside in a car, in a tent, in an abandoned building, in an overnight shelter, or couch-surfing.) Yes   Are you worried about losing your housing? No   Lack of transportation? No   Unable to get utilities (heat,electricity)? No   (!) FOOD SECURITY CONCERN PRESENT      4/13/2025   Fall Risk   Fallen 2 or more times in the past year? No   Trouble with walking or balance? No          4/13/2025   Activities of Daily Living- Home Safety   Needs help with the following daily activites None of the above   Safety concerns in the home None of the above         4/13/2025   Dental   Dentist two times every year? Yes         4/13/2025   Hearing Screening   Hearing concerns? (!) IT'S HARD TO FOLLOW A CONVERSATION IN A NOISY RESTAURANT OR CROWDED ROOM.         4/13/2025   Driving Risk Screening   Patient/family members have concerns about driving No         4/13/2025   General Alertness/Fatigue Screening   Have you been more tired than usual lately? No         4/13/2025   Urinary Incontinence Screening   Bothered by leaking urine in past 6 months Yes     Today's PHQ-2 Score:       4/15/2025     1:10 PM   PHQ-2 ( 1999 [employer])   Q1: Little interest or pleasure in doing things 0   Q2: Feeling down, depressed or hopeless 0   PHQ-2 Score 0    Q1: Little interest or pleasure in doing things Not at all   Q2:  "Feeling down, depressed or hopeless Not at all   PHQ-2 Score 0       Patient-reported         4/13/2025   Substance Use   Alcohol more than 3/day or more than 7/wk No   Do you have a current opioid prescription? No   How severe/bad is pain from 1 to 10? 0/10 (No Pain)   Do you use any other substances recreationally? No     Social History     Tobacco Use    Smoking status: Never    Smokeless tobacco: Never   Vaping Use    Vaping status: Never Used   Substance Use Topics    Alcohol use: Not Currently     Comment: rare    Drug use: Never              Reviewed and updated as needed this visit by Provider                             Current providers sharing in care for this patient include:  Patient Care Team:  Bryant Weeks MD as PCP - General (Pediatrics)  Bryant Weeks MD as Assigned PCP  Mahamed Meneses MD as Assigned Sleep Provider  Adan Hurtado MD as Assigned Heart and Vascular Provider  Brian Avilez MD as MD (Neurological Surgery)  Brian Avilez MD as Assigned Neuroscience Provider    The following health maintenance items are reviewed in Epic and correct as of today:  Health Maintenance   Topic Date Due    DTAP/TDAP/TD IMMUNIZATION (1 - Tdap) Never done    LIPID  02/15/2025    COVID-19 Vaccine (8 - 2024-25 season) 05/05/2025    MEDICARE ANNUAL WELLNESS VISIT  04/15/2026    FALL RISK ASSESSMENT  04/15/2026    DIABETES SCREENING  02/03/2028    ADVANCE CARE PLANNING  05/06/2029    HEPATITIS C SCREENING  Completed    PHQ-2 (once per calendar year)  Completed    INFLUENZA VACCINE  Completed    Pneumococcal Vaccine: 50+ Years  Completed    ZOSTER IMMUNIZATION  Completed    RSV VACCINE  Completed    HPV IMMUNIZATION  Aged Out    MENINGITIS IMMUNIZATION  Aged Out    COLORECTAL CANCER SCREENING  Discontinued        Objective      Exam  /84   Pulse 85   Temp (!) 96.3  F (35.7  C)   Resp 14   Ht 1.822 m (5' 11.75\")   Wt 93.8 kg (206 lb 11.2 oz)   SpO2 97%   BMI " 28.23 kg/m           4/15/2025   Mini Cog   Clock Draw Score 2 Normal   3 Item Recall 3 objects recalled   Mini Cog Total Score 5          Signed Electronically by: Bryant Weeks MD

## 2025-04-15 NOTE — PATIENT INSTRUCTIONS
Patient Education   Preventive Care Advice   This is general advice given by our system to help you stay healthy. However, your care team may have specific advice just for you. Please talk to your care team about your preventive care needs.  Nutrition  Eat 5 or more servings of fruits and vegetables each day.  Try wheat bread, brown rice and whole grain pasta (instead of white bread, rice, and pasta).  Get enough calcium and vitamin D. Check the label on foods and aim for 100% of the RDA (recommended daily allowance).  Lifestyle  Exercise at least 150 minutes each week  (30 minutes a day, 5 days a week).  Do muscle strengthening activities 2 days a week. These help control your weight and prevent disease.  No smoking.  Wear sunscreen to prevent skin cancer.  Have a dental exam and cleaning every 6 months.  Yearly exams  See your health care team every year to talk about:  Any changes in your health.  Any medicines your care team has prescribed.  Preventive care, family planning, and ways to prevent chronic diseases.  Shots (vaccines)   HPV shots (up to age 26), if you've never had them before.  Hepatitis B shots (up to age 59), if you've never had them before.  COVID-19 shot: Get this shot when it's due.  Flu shot: Get a flu shot every year.  Tetanus shot: Get a tetanus shot every 10 years.  Pneumococcal, hepatitis A, and RSV shots: Ask your care team if you need these based on your risk.  Shingles shot (for age 50 and up)  General health tests  Diabetes screening:  Starting at age 35, Get screened for diabetes at least every 3 years.  If you are younger than age 35, ask your care team if you should be screened for diabetes.  Cholesterol test: At age 39, start having a cholesterol test every 5 years, or more often if advised.  Bone density scan (DEXA): At age 50, ask your care team if you should have this scan for osteoporosis (brittle bones).  Hepatitis C: Get tested at least once in your life.  STIs (sexually  transmitted infections)  Before age 24: Ask your care team if you should be screened for STIs.  After age 24: Get screened for STIs if you're at risk. You are at risk for STIs (including HIV) if:  You are sexually active with more than one person.  You don't use condoms every time.  You or a partner was diagnosed with a sexually transmitted infection.  If you are at risk for HIV, ask about PrEP medicine to prevent HIV.  Get tested for HIV at least once in your life, whether you are at risk for HIV or not.  Cancer screening tests  Cervical cancer screening: If you have a cervix, begin getting regular cervical cancer screening tests starting at age 21.  Breast cancer scan (mammogram): If you've ever had breasts, begin having regular mammograms starting at age 40. This is a scan to check for breast cancer.  Colon cancer screening: It is important to start screening for colon cancer at age 45.  Have a colonoscopy test every 10 years (or more often if you're at risk) Or, ask your provider about stool tests like a FIT test every year or Cologuard test every 3 years.  To learn more about your testing options, visit:   .  For help making a decision, visit:   https://bit.ly/qg31982.  Prostate cancer screening test: If you have a prostate, ask your care team if a prostate cancer screening test (PSA) at age 55 is right for you.  Lung cancer screening: If you are a current or former smoker ages 50 to 80, ask your care team if ongoing lung cancer screenings are right for you.  For informational purposes only. Not to replace the advice of your health care provider. Copyright   2023 [facility] [employer]. All rights reserved. Clinically reviewed by the [facility] Transitions Program. [employer] [id number] - REV 01/24.  Hearing Loss: Care Instructions  Overview     Hearing loss is a sudden or slow decrease in how well you hear. It can range from slight to profound. Permanent hearing loss can occur with aging. It also can  happen when you are exposed long-term to loud noise. Examples include listening to loud music, riding motorcycles, or being around other loud machines.  Hearing loss can affect your work and home life. It can make you feel lonely or depressed. You may feel that you have lost your independence. But hearing aids and other devices can help you hear better and feel connected to others.  Follow-up care is a key part of your treatment and safety. Be sure to make and go to all appointments, and call your doctor if you are having problems. It's also a good idea to know your test results and keep a list of the medicines you take.  How can you care for yourself at home?  Avoid loud noises whenever possible. This helps keep your hearing from getting worse.  Always wear hearing protection around loud noises.  Wear a hearing aid as directed.  A professional can help you pick a hearing aid that will work best for you.  You can also get hearing aids over the counter for mild to moderate hearing loss.  Have hearing tests as your doctor suggests. They can show whether your hearing has changed. Your hearing aid may need to be adjusted.  Use other devices as needed. These may include:  Telephone amplifiers and hearing aids that can connect to a television, stereo, radio, or microphone.  Devices that use lights or vibrations. These alert you to the doorbell, a ringing telephone, or a baby monitor.  Television closed-captioning. This shows the words at the bottom of the screen. Most new TVs can do this.  TTY (text telephone). This lets you type messages back and forth on the telephone instead of talking or listening. These devices are also called TDD. When messages are typed on the keyboard, they are sent over the phone line to a receiving TTY. The message is shown on a monitor.  Use text messaging, social media, and email if it is hard for you to communicate by telephone.  Try to learn a listening technique called speechreading. It is  "not lipreading. You pay attention to people's gestures, expressions, posture, and tone of voice. These clues can help you understand what a person is saying. Face the person you are talking to, and have them face you. Make sure the lighting is good. You need to see the other person's face clearly.  Think about counseling if you need help to adjust to your hearing loss.  When should you call for help?  Watch closely for changes in your health, and be sure to contact your doctor if:    You think your hearing is getting worse.     You have new symptoms, such as dizziness or nausea.   Where can you learn more?  Go to https://www.[employer]/patiented  Enter R798 in the search box to learn more about \"Hearing Loss: Care Instructions.\"  Current as of: October 27, 2024  Content Version: 14.4    6580-3270 [employer].   Care instructions adapted under license by your healthcare professional. If you have questions about a medical condition or this instruction, always ask your healthcare professional. [employer] disclaims any warranty or liability for your use of this information.    Learning About Stress  What is stress?     Stress is your body's response to a hard situation. Your body can have a physical, emotional, or mental response. Stress is a fact of life for most people, and it affects everyone differently. What causes stress for you may not be stressful for someone else.  A lot of things can cause stress. You may feel stress when you go on a job interview, take a test, or run a race. This kind of short-term stress is normal and even useful. It can help you if you need to work hard or react quickly. For example, stress can help you finish an important job on time.  Long-term stress is caused by ongoing stressful situations or events. Examples of long-term stress include long-term health problems, ongoing problems at work, or conflicts in your family. Long-term stress can harm your " health.  How does stress affect your health?  When you are stressed, your body responds as though you are in danger. It makes hormones that speed up your heart, make you breathe faster, and give you a burst of energy. This is called the fight-or-flight stress response. If the stress is over quickly, your body goes back to normal and no harm is done.  But if stress happens too often or lasts too long, it can have bad effects. Long-term stress can make you more likely to get sick, and it can make symptoms of some diseases worse. If you tense up when you are stressed, you may develop neck, shoulder, or low back pain. Stress is linked to high blood pressure and heart disease.  Stress also harms your emotional health. It can make you mao, tense, or depressed. Your relationships may suffer, and you may not do well at work or school.  What can you do to manage stress?  You can try these things to help manage stress:   Do something active. Exercise or activity can help reduce stress. Walking is a great way to get started. Even everyday activities such as housecleaning or yard work can help.  Try yoga or lay chi. These techniques combine exercise and meditation. You may need some training at first to learn them.  Do something you enjoy. For example, listen to music or go to a movie. Practice your hobby or do volunteer work.  Meditate. This can help you relax, because you are not worrying about what happened before or what may happen in the future.  Do guided imagery. Imagine yourself in any setting that helps you feel calm. You can use online videos, books, or a teacher to guide you.  Do breathing exercises. For example:  From a standing position, bend forward from the waist with your knees slightly bent. Let your arms dangle close to the floor.  Breathe in slowly and deeply as you return to a standing position. Roll up slowly and lift your head last.  Hold your breath for just a few seconds in the standing  "position.  Breathe out slowly and bend forward from the waist.  Let your feelings out. Talk, laugh, cry, and express anger when you need to. Talking with supportive friends or family, a counselor, or a ines leader about your feelings is a healthy way to relieve stress. Avoid discussing your feelings with people who make you feel worse.  Write. It may help to write about things that are bothering you. This helps you find out how much stress you feel and what is causing it. When you know this, you can find better ways to cope.  What can you do to prevent stress?  You might try some of these things to help prevent stress:  Manage your time. This helps you find time to do the things you want and need to do.  Get enough sleep. Your body recovers from the stresses of the day while you are sleeping.  Get support. Your family, friends, and community can make a difference in how you experience stress.  Limit your news feed. Avoid or limit time on social media or news that may make you feel stressed.  Do something active. Exercise or activity can help reduce stress. Walking is a great way to get started.  Where can you learn more?  Go to https://www.[employer].net/patiented  Enter N032 in the search box to learn more about \"Learning About Stress.\"  Current as of: October 24, 2024  Content Version: 14.4 2024-2025 [employer].   Care instructions adapted under license by your healthcare professional. If you have questions about a medical condition or this instruction, always ask your healthcare professional. [employer] disclaims any warranty or liability for your use of this information.    Bladder Training: Care Instructions  Your Care Instructions     Bladder training is used to treat urge incontinence and stress incontinence. Urge incontinence means that the need to urinate comes on so fast that you can't get to a toilet in time. Stress incontinence means that you leak urine because of pressure on " your bladder. For example, it may happen when you laugh, cough, or lift something heavy.  Bladder training can increase how long you can wait before you have to urinate. It can also help your bladder hold more urine. And it can give you better control over the urge to urinate.  It is important to remember that bladder training takes a few weeks to a few months to make a difference. You may not see results right away, but don't give up.  Follow-up care is a key part of your treatment and safety. Be sure to make and go to all appointments, and call your doctor if you are having problems. It's also a good idea to know your test results and keep a list of the medicines you take.  How can you care for yourself at home?  Work with your doctor to come up with a bladder training program that is right for you. You may use one or more of the following methods.  Delayed urination  In the beginning, try to keep from urinating for 5 minutes after you first feel the need to go.  While you wait, take deep, slow breaths to relax. Kegel exercises can also help you delay the need to go to the bathroom.  After some practice, when you can easily wait 5 minutes to urinate, try to wait 10 minutes before you urinate.  Slowly increase the waiting period until you are able to control when you have to urinate.  Scheduled urination  Empty your bladder when you first wake up in the morning.  Schedule times throughout the day when you will urinate.  Start by going to the bathroom every hour, even if you don't need to go.  Slowly increase the time between trips to the bathroom.  When you have found a schedule that works well for you, keep doing it.  If you wake up during the night and have to urinate, do it. Apply your schedule to waking hours only.  Kegel exercises  These tighten and strengthen pelvic muscles, which can help you control the flow of urine. (If doing these exercises causes pain, stop doing them and talk with your doctor.) To do  "Kegel exercises:  Squeeze your muscles as if you were trying not to pass gas. Or squeeze your muscles as if you were stopping the flow of urine. Your belly, legs, and buttocks shouldn't move.  Hold the squeeze for 3 seconds, then relax for 5 to 10 seconds.  Start with 3 seconds, then add 1 second each week until you are able to squeeze for 10 seconds.  Repeat the exercise 10 times a session. Do 3 to 8 sessions a day.  When should you call for help?  Watch closely for changes in your health, and be sure to contact your doctor if:    Your incontinence is getting worse.     You do not get better as expected.   Where can you learn more?  Go to https://www.[employer].net/patiented  Enter V684 in the search box to learn more about \"Bladder Training: Care Instructions.\"  Current as of: April 30, 2024  Content Version: 14.4    9463-1668 [employer].   Care instructions adapted under license by your healthcare professional. If you have questions about a medical condition or this instruction, always ask your healthcare professional. [employer] disclaims any warranty or liability for your use of this information.       "

## 2025-04-17 ENCOUNTER — LAB (OUTPATIENT)
Dept: LAB | Facility: OTHER | Age: 75
End: 2025-04-17
Attending: INTERNAL MEDICINE
Payer: MEDICARE

## 2025-04-17 DIAGNOSIS — R73.03 PRE-DIABETES: ICD-10-CM

## 2025-04-17 DIAGNOSIS — E78.2 MIXED HYPERLIPIDEMIA: ICD-10-CM

## 2025-04-17 LAB
ANION GAP SERPL CALCULATED.3IONS-SCNC: 9 MMOL/L (ref 7–15)
BUN SERPL-MCNC: 19 MG/DL (ref 8–23)
CALCIUM SERPL-MCNC: 9.8 MG/DL (ref 8.8–10.4)
CHLORIDE SERPL-SCNC: 106 MMOL/L (ref 98–107)
CHOLEST SERPL-MCNC: 200 MG/DL
CREAT SERPL-MCNC: 1.11 MG/DL (ref 0.67–1.17)
EGFRCR SERPLBLD CKD-EPI 2021: 69 ML/MIN/1.73M2
EST. AVERAGE GLUCOSE BLD GHB EST-MCNC: 111 MG/DL
FASTING STATUS PATIENT QL REPORTED: YES
FASTING STATUS PATIENT QL REPORTED: YES
GLUCOSE SERPL-MCNC: 106 MG/DL (ref 70–99)
HBA1C MFR BLD: 5.5 %
HCO3 SERPL-SCNC: 26 MMOL/L (ref 22–29)
HDLC SERPL-MCNC: 60 MG/DL
LDLC SERPL CALC-MCNC: 121 MG/DL
NONHDLC SERPL-MCNC: 140 MG/DL
POTASSIUM SERPL-SCNC: 4.5 MMOL/L (ref 3.4–5.3)
SODIUM SERPL-SCNC: 141 MMOL/L (ref 135–145)
TRIGL SERPL-MCNC: 93 MG/DL

## 2025-04-17 PROCEDURE — 80061 LIPID PANEL: CPT | Mod: ZL

## 2025-04-17 PROCEDURE — 82310 ASSAY OF CALCIUM: CPT | Mod: ZL

## 2025-04-17 PROCEDURE — 36415 COLL VENOUS BLD VENIPUNCTURE: CPT | Mod: ZL

## 2025-04-17 PROCEDURE — 80048 BASIC METABOLIC PNL TOTAL CA: CPT | Mod: ZL

## 2025-04-17 PROCEDURE — 83036 HEMOGLOBIN GLYCOSYLATED A1C: CPT | Mod: ZL

## 2025-04-23 DIAGNOSIS — N40.1 BENIGN LOCALIZED PROSTATIC HYPERPLASIA WITH LOWER URINARY TRACT SYMPTOMS (LUTS): Primary | ICD-10-CM

## 2025-04-29 DIAGNOSIS — E78.2 MIXED HYPERLIPIDEMIA: ICD-10-CM

## 2025-05-01 RX ORDER — ATORVASTATIN CALCIUM 10 MG/1
10 TABLET, FILM COATED ORAL DAILY
Qty: 90 TABLET | Refills: 3 | Status: SHIPPED | OUTPATIENT
Start: 2025-05-01

## 2025-05-01 NOTE — TELEPHONE ENCOUNTER
[id number] sent Rx request for the following:      Requested Prescriptions   Pending Prescriptions Disp Refills    atorvastatin (LIPITOR) 10 MG tablet [Pharmacy Med Name: Atorvastatin Calcium 10 MG Oral Tablet] 83 tablet 0     Sig: Take 1 tablet by mouth once daily       Antihyperlipidemic agents Passed - 5/1/2025 10:35 AM        Last Prescription Date:   4/15/2025  Last Fill Qty/Refills:         90, R-4    Last Office Visit:              4/15/2025   Future Office visit:           none     Prescription approved per [facility] Refill Protocol.  Curtis Walker RN on 5/1/2025 at 10:37 AM

## 2025-05-08 ENCOUNTER — RESULTS FOLLOW-UP (OUTPATIENT)
Dept: UROLOGY | Facility: OTHER | Age: 75
End: 2025-05-08

## 2025-05-08 ENCOUNTER — OFFICE VISIT (OUTPATIENT)
Dept: UROLOGY | Facility: OTHER | Age: 75
End: 2025-05-08
Attending: INTERNAL MEDICINE
Payer: MEDICARE

## 2025-05-08 ENCOUNTER — TELEPHONE (OUTPATIENT)
Dept: PEDIATRICS | Facility: OTHER | Age: 75
End: 2025-05-08

## 2025-05-08 ENCOUNTER — LAB (OUTPATIENT)
Dept: LAB | Facility: OTHER | Age: 75
End: 2025-05-08
Attending: UROLOGY
Payer: MEDICARE

## 2025-05-08 VITALS
BODY MASS INDEX: 27.9 KG/M2 | WEIGHT: 206 LBS | OXYGEN SATURATION: 98 % | TEMPERATURE: 96.8 F | RESPIRATION RATE: 18 BRPM | HEIGHT: 72 IN | HEART RATE: 80 BPM

## 2025-05-08 DIAGNOSIS — R31.29 MICROSCOPIC HEMATURIA: Primary | ICD-10-CM

## 2025-05-08 DIAGNOSIS — N40.1 BPH ASSOCIATED WITH NOCTURIA: ICD-10-CM

## 2025-05-08 DIAGNOSIS — R35.1 BPH ASSOCIATED WITH NOCTURIA: ICD-10-CM

## 2025-05-08 DIAGNOSIS — D35.2 PROLACTIN SECRETING PITUITARY ADENOMA (H): Primary | ICD-10-CM

## 2025-05-08 DIAGNOSIS — N40.1 BENIGN LOCALIZED PROSTATIC HYPERPLASIA WITH LOWER URINARY TRACT SYMPTOMS (LUTS): ICD-10-CM

## 2025-05-08 DIAGNOSIS — N52.1 ERECTILE DYSFUNCTION DUE TO DISEASES CLASSIFIED ELSEWHERE: Primary | ICD-10-CM

## 2025-05-08 DIAGNOSIS — E29.1 HYPOGONADISM MALE: ICD-10-CM

## 2025-05-08 DIAGNOSIS — R35.1 BPH ASSOCIATED WITH NOCTURIA: Primary | ICD-10-CM

## 2025-05-08 DIAGNOSIS — N40.1 BPH ASSOCIATED WITH NOCTURIA: Primary | ICD-10-CM

## 2025-05-08 LAB
ALBUMIN UR-MCNC: NEGATIVE MG/DL
APPEARANCE UR: CLEAR
BILIRUB UR QL STRIP: NEGATIVE
COLOR UR AUTO: YELLOW
GLUCOSE UR STRIP-MCNC: NEGATIVE MG/DL
HGB UR QL STRIP: ABNORMAL
KETONES UR STRIP-MCNC: NEGATIVE MG/DL
LEUKOCYTE ESTERASE UR QL STRIP: NEGATIVE
MUCOUS THREADS #/AREA URNS LPF: PRESENT /LPF
NITRATE UR QL: NEGATIVE
PH UR STRIP: 5 [PH] (ref 5–9)
RBC URINE: 3 /HPF
SP GR UR STRIP: 1.03 (ref 1–1.03)
UROBILINOGEN UR STRIP-MCNC: NORMAL MG/DL
WBC URINE: <1 /HPF

## 2025-05-08 PROCEDURE — 51798 US URINE CAPACITY MEASURE: CPT | Performed by: UROLOGY

## 2025-05-08 PROCEDURE — 81001 URINALYSIS AUTO W/SCOPE: CPT | Mod: ZL

## 2025-05-08 PROCEDURE — G0463 HOSPITAL OUTPT CLINIC VISIT: HCPCS | Mod: 25

## 2025-05-08 PROCEDURE — 81003 URINALYSIS AUTO W/O SCOPE: CPT | Mod: ZL

## 2025-05-08 RX ORDER — TADALAFIL 5 MG/1
5 TABLET ORAL EVERY 24 HOURS
Qty: 30 TABLET | Refills: 11 | Status: SHIPPED | OUTPATIENT
Start: 2025-05-08

## 2025-05-08 NOTE — TELEPHONE ENCOUNTER
ICD-10-CM    1. 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

## 2025-05-08 NOTE — RESULT ENCOUNTER NOTE
Lila, patient needs CT Urogram and cystoscopy.  I ordered CT scan and he is aware.  I also ordered a BMP.  When he sees me in 8 weeks we do schedule that for cystoscopy.  Thanks Renny

## 2025-05-08 NOTE — NURSING NOTE
"Chief Complaint   Patient presents with    Consult     LUTS, erectile dysfunction        Initial Pulse 80   Temp 96.8  F (36  C) (Tympanic)   Resp 18   Ht 1.822 m (5' 11.75\")   Wt 93.4 kg (206 lb)   SpO2 98%   BMI 28.13 kg/m   Estimated body mass index is 28.13 kg/m  as calculated from the following:    Height as of this encounter: 1.822 m (5' 11.75\").    Weight as of this encounter: 93.4 kg (206 lb).  Medication Reconciliation: complete    AUA- 9  post void residual -  26 ml    Alta Lee LPN    " Satisfactory
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
## 2025-05-08 NOTE — TELEPHONE ENCOUNTER
----- Message from Jose Suazo sent at 5/8/2025  2:29 PM CDT -----  Regarding: endocrinology  Duane,He needs a referral to endocrinology for his prolactin secreting pituitary adenoma ;which is causing hypogonadism.His neurologist didn't do so?Can you help?Thanks, Renny

## 2025-06-03 ENCOUNTER — HOSPITAL ENCOUNTER (OUTPATIENT)
Dept: CT IMAGING | Facility: OTHER | Age: 75
Discharge: HOME | End: 2025-06-03
Attending: UROLOGY
Payer: MEDICARE

## 2025-06-03 ENCOUNTER — RESULTS FOLLOW-UP (OUTPATIENT)
Dept: UROLOGY | Facility: OTHER | Age: 75
End: 2025-06-03

## 2025-06-03 ENCOUNTER — LAB (OUTPATIENT)
Dept: LAB | Facility: OTHER | Age: 75
End: 2025-06-03
Attending: UROLOGY
Payer: MEDICARE

## 2025-06-03 DIAGNOSIS — R31.29 MICROSCOPIC HEMATURIA: ICD-10-CM

## 2025-06-03 LAB
ANION GAP SERPL CALCULATED.3IONS-SCNC: 10 MMOL/L (ref 7–15)
BUN SERPL-MCNC: 18.4 MG/DL (ref 8–23)
CALCIUM SERPL-MCNC: 9.5 MG/DL (ref 8.8–10.4)
CHLORIDE SERPL-SCNC: 108 MMOL/L (ref 98–107)
CREAT SERPL-MCNC: 1.08 MG/DL (ref 0.67–1.17)
EGFRCR SERPLBLD CKD-EPI 2021: 71 ML/MIN/1.73M2
GLUCOSE SERPL-MCNC: 105 MG/DL (ref 70–99)
HCO3 SERPL-SCNC: 23 MMOL/L (ref 22–29)
POTASSIUM SERPL-SCNC: 4.4 MMOL/L (ref 3.4–5.3)
SODIUM SERPL-SCNC: 141 MMOL/L (ref 135–145)

## 2025-06-03 PROCEDURE — 250N000011 HC RX IP 250 OP 636: Performed by: UROLOGY

## 2025-06-03 PROCEDURE — 80048 BASIC METABOLIC PNL TOTAL CA: CPT | Mod: ZL

## 2025-06-03 PROCEDURE — 74178 CT ABD&PLV WO CNTR FLWD CNTR: CPT | Mod: 26 | Performed by: RADIOLOGY

## 2025-06-03 PROCEDURE — 250N000009 HC RX 250: Performed by: UROLOGY

## 2025-06-03 PROCEDURE — 74178 CT ABD&PLV WO CNTR FLWD CNTR: CPT

## 2025-06-03 PROCEDURE — 36415 COLL VENOUS BLD VENIPUNCTURE: CPT | Mod: ZL

## 2025-06-03 RX ORDER — IOPAMIDOL 755 MG/ML
118 INJECTION, SOLUTION INTRAVASCULAR ONCE
Status: COMPLETED | OUTPATIENT
Start: 2025-06-03 | End: 2025-06-03

## 2025-06-03 RX ADMIN — SODIUM CHLORIDE 60 ML: 9 INJECTION, SOLUTION INTRAVENOUS at 12:10

## 2025-06-03 RX ADMIN — IOPAMIDOL 118 ML: 755 INJECTION, SOLUTION INTRAVENOUS at 12:10

## 2025-06-25 DIAGNOSIS — R31.29 MICROSCOPIC HEMATURIA: Primary | ICD-10-CM

## 2025-06-25 DIAGNOSIS — N40.1 BENIGN LOCALIZED PROSTATIC HYPERPLASIA WITH LOWER URINARY TRACT SYMPTOMS (LUTS): ICD-10-CM

## 2025-06-26 ENCOUNTER — TELEPHONE (OUTPATIENT)
Dept: UROLOGY | Facility: OTHER | Age: 75
End: 2025-06-26
Payer: COMMERCIAL

## 2025-06-26 NOTE — TELEPHONE ENCOUNTER
After proper verification,patient was relayed message to hold his Eliquis 2 days prior to Cystoscopy.  Tabitha Gonsales LPN on 6/26/2025 at 11:51 AM  EXT. [id number]

## 2025-07-09 ENCOUNTER — LAB (OUTPATIENT)
Dept: LAB | Facility: OTHER | Age: 75
End: 2025-07-09
Attending: UROLOGY
Payer: MEDICARE

## 2025-07-09 ENCOUNTER — OFFICE VISIT (OUTPATIENT)
Dept: UROLOGY | Facility: OTHER | Age: 75
End: 2025-07-09
Attending: UROLOGY
Payer: MEDICARE

## 2025-07-09 DIAGNOSIS — N40.1 BENIGN LOCALIZED PROSTATIC HYPERPLASIA WITH LOWER URINARY TRACT SYMPTOMS (LUTS): ICD-10-CM

## 2025-07-09 DIAGNOSIS — E29.1 HYPOGONADISM MALE: ICD-10-CM

## 2025-07-09 DIAGNOSIS — R31.29 MICROSCOPIC HEMATURIA: ICD-10-CM

## 2025-07-09 DIAGNOSIS — R31.21 ASYMPTOMATIC MICROSCOPIC HEMATURIA: Primary | ICD-10-CM

## 2025-07-09 DIAGNOSIS — N52.1 ERECTILE DYSFUNCTION DUE TO DISEASES CLASSIFIED ELSEWHERE: ICD-10-CM

## 2025-07-09 LAB
ALBUMIN UR-MCNC: NEGATIVE MG/DL
APPEARANCE UR: CLEAR
BILIRUB UR QL STRIP: NEGATIVE
COLOR UR AUTO: ABNORMAL
GLUCOSE UR STRIP-MCNC: NEGATIVE MG/DL
HGB UR QL STRIP: ABNORMAL
KETONES UR STRIP-MCNC: NEGATIVE MG/DL
LEUKOCYTE ESTERASE UR QL STRIP: NEGATIVE
NITRATE UR QL: NEGATIVE
PH UR STRIP: 5.5 [PH] (ref 5–9)
SP GR UR STRIP: 1.02 (ref 1–1.03)
UROBILINOGEN UR STRIP-MCNC: NORMAL MG/DL

## 2025-07-09 PROCEDURE — 250N000009 HC RX 250: Performed by: UROLOGY

## 2025-07-09 PROCEDURE — G0463 HOSPITAL OUTPT CLINIC VISIT: HCPCS | Mod: 25

## 2025-07-09 PROCEDURE — 81003 URINALYSIS AUTO W/O SCOPE: CPT | Mod: ZL

## 2025-07-09 PROCEDURE — 250N000013 HC RX MED GY IP 250 OP 250 PS 637: Performed by: UROLOGY

## 2025-07-09 RX ORDER — LIDOCAINE HYDROCHLORIDE 20 MG/ML
JELLY TOPICAL ONCE
Status: COMPLETED | OUTPATIENT
Start: 2025-07-09 | End: 2025-07-09

## 2025-07-09 RX ORDER — TAMSULOSIN HYDROCHLORIDE 0.4 MG/1
0.4 CAPSULE ORAL DAILY
Qty: 30 CAPSULE | Refills: 11 | Status: SHIPPED | OUTPATIENT
Start: 2025-07-09

## 2025-07-09 RX ADMIN — CEPHALEXIN 250 MG: 250 CAPSULE ORAL at 10:53

## 2025-07-09 RX ADMIN — LIDOCAINE HYDROCHLORIDE: 20 JELLY TOPICAL at 10:53

## 2025-07-09 NOTE — NURSING NOTE
Patient positioned in supine position, perineum area prepped with chlorhexidene Gluconate, betadine swabs, patient draped per sterile technique. Per verbal order read back by Jose Suazo MD, Urojet 10mL 2% lidocaine jelly to be instilled into urethra.      Universal Protocol    A. Pre-procedure verification complete Yes  1-relevant information / documentation available, reviewed and properly matched to the patient; 2-consent accurate and complete, 3-equipment and supplies available    B. Site marking complete N/A  Site marked if not in continuous attendance with patient    C. TIME OUT completed Yes  Time Out was conducted just prior to starting procedure to verify the eight required elements: 1-patient identity, 2-consent accurate and complete, 3-position, 4-correct side/site marked (if applicable), 5-procedure, 6-relevant images / results properly labeled and displayed (if applicable), 7-antibiotics / irrigation fluids (if applicable), 8-safety precautions.    After procedure perineum area rinsed. Discharge instructions reviewed with patient. Patient verbalized understanding of discharge instructions and discharged ambulatory.  Rashida Marcos RN..................7/9/2025  9:44 AM

## 2025-07-09 NOTE — PROGRESS NOTES
74-year-old male with a history of A-fib on Eliquis and recent pituitary macroadenoma who was seen recently in May for ED and underlying BPH.    He has struggled with some urinary urgency and rare urge incontinence.  He also has nocturia 2-3 episodes at night.    He has never been on any medication for his prostate such as tamsulosin.    He was began on Cialis 5 mg daily for BPH as well as ED.    He was found to have microscopic hematuria on his UA at his last visit prompting today's visit for cystoscopy.    CT urogram 6/3/2025 without evidence of obstruction or cause for his hematuria.    CT UROGRAM WO & W CONTRAST     History: 74 years Male with microscopic hematuria.      Comparisons:     Technique: Axial CT imaging of the abdomen and pelvis was performed  without and with intervenous contrast. Coronal and sagittal  reconstructions were obtained .   This exam was performed using one or more of the following dose  reduction techniques:  Automated exposure control, adjustment of the PATRICE and/or KV according  to patient's size, and/or use of iterative reconstruction technique.     FINDINGS:     Kidneys:No renal or ureteral calculi are present. There is no  hydronephrosis.  There is bilateral symmetric renal enhancement and excretion. There is  no evidence of renal mass. No filling defects are seen within the  renal collecting systems, ureters or bladder. There is a small simple  cyst involving the right kidney measuring 11 mm in diameter.        Lung bases:The lung bases are clear     Abdomen:  Liver:Unremarkable  Gallbladder and biliary tree:No calcified gallstones are present.  There is no evidence of biliary dilatation.  Pancreas:Unremarkable  Spleen:Unremarkable  Adrenals:Normal     Lymph nodes:There is no significant lymphadenopathy     Bowel:No abnormally distended or thickened loops of bowel are present.  There is no evidence of bowel obstruction.     Appendix:Unremarkable     Vessels:Unremarkable     Osseous  structures:Unremarkable        Pelvis:There is no evidence of mass or lymphadenopathy. No abnormal  fluid collections are present.                                                                               IMPRESSION: Bilateral symmetric renal function without evidence of  obstruction. No evidence of renal or ureteral calculus. No evidence of  renal mass. The bladder is unremarkable.     SHARONDA QUIJANO MD        Male Cystoscopy Procedure Note     PRE-PROCEDURE DIAGNOSIS: Microscopic hematuria, BPH  POST-PROCEDURE DIAGNOSIS: Same  PROCEDURE: cystoscopy    RISKS DISCUSSED:  Bleeding, infection, urethral stricture, irritative voiding symptoms, retention of urine.      DESCRIPTION OF PROCEDURE:  After informed consent was obtained, the patient was brought to the procedure room where he was placed in the supine position with all pressure points well padded.  The penis and scrotum were prepped and draped in a sterile fashion. A flexible cystoscope was introduced through a well-lubricated urethra.      FINDINGS:    Meatus: normal  Urethra: Normal no stricture  Prostate: Moderate obstruction with kissing lateral lobes, small posterior median lobe  Bladder Neck: Open  Bladder: No visible tumor, stones or lesions mild trabeculation few small cellules  Trigone:  Normal ureters, normal efflux    The flexible cystoscope was removed and the findings were described to the patient.     FINDINGS: No findings to account for his hematuria.  Mild to moderate bladder outlet obstruction    Patient reassured as his workup for hematuria is negative.    Regarding his underlying BPH, he will remain on the Cialis but begin tamsulosin in the evening.  He should take the Cialis and tamsulosin 12 hours apart.  He was told to watch out for dizziness upon standing.    We will follow-up with him in 6 months for recheck.  He was asked to call regarding the tamsulosin if it does help at which time I will give him a 90-day supply.    All  questions were addressed

## 2025-07-15 ENCOUNTER — TELEPHONE (OUTPATIENT)
Dept: ENDOCRINOLOGY | Facility: CLINIC | Age: 75
End: 2025-07-15
Payer: COMMERCIAL

## 2025-07-15 NOTE — TELEPHONE ENCOUNTER
Patient confirmed scheduled appointment:  Date: 07/22/25  Time: 10:00  Visit type: NPI  Provider: Pamella Millan MD  Location: [facility] DI[facility]  Testing/imaging: N/A   Additional notes: Scheduled per pt, will be in [place] for visit.    Samina Cade MD  [id number]; Piper Vazquez, RN  To schedulers : please schedule , in this order of preference, 1) open new/JUSTYNA, 2) on call consult service JUSTYNA within 1-2 week. This could be a virtual visit.      Jessica Darnell on 7/15/2025 at 1:08 PM

## 2025-07-16 NOTE — CONFIDENTIAL NOTE
RECORDS RECEIVED FROM: internal    DATE RECEIVED:  7.22.25    NOTES (FOR ALL VISITS) STATUS DETAILS   OFFICE NOTES from referring provider internal  Bryant Weeks MD    OFFICE NOTES from other specialist internal  2.3.25 Raghav      ED NOTES internal  1.23.25 [place]      MEDICATION LIST internal     IMAGING      MRI (BRAIN) internal  1.23.25,    LABS     DIABETES: HBGA1C, CREATININE, FASTING LIPIDS, MICROALBUMIN URINE, POTASSIUM, TSH, T4    THYROID: TSH, T4, CBC, THYRODLONULIN, TOTAL T3, FREE T4, CALCITONIN, CEA internal  Bmp- 6.3.25   HBGA1C- 4.17.25    Lipid- 4.17.25   Cortisol- 2.3.25   TSH/T4- 2.3.25   Prolactin- 2.3.25   Cbc- 1.23.25

## 2025-07-22 ENCOUNTER — VIRTUAL VISIT (OUTPATIENT)
Dept: ENDOCRINOLOGY | Facility: CLINIC | Age: 75
End: 2025-07-22
Payer: COMMERCIAL

## 2025-07-22 ENCOUNTER — PRE VISIT (OUTPATIENT)
Dept: ENDOCRINOLOGY | Facility: CLINIC | Age: 75
End: 2025-07-22

## 2025-07-22 DIAGNOSIS — E22.1 HYPERPROLACTINEMIA: Primary | ICD-10-CM

## 2025-07-22 PROCEDURE — 98009 SYNCH AUDIO-ONLY NEW LOW 30: CPT | Performed by: INTERNAL MEDICINE

## 2025-07-22 PROCEDURE — 1126F AMNT PAIN NOTED NONE PRSNT: CPT | Mod: 95 | Performed by: INTERNAL MEDICINE

## 2025-07-22 PROCEDURE — G2211 COMPLEX E/M VISIT ADD ON: HCPCS | Performed by: INTERNAL MEDICINE

## 2025-07-22 ASSESSMENT — PAIN SCALES - GENERAL: PAINLEVEL_OUTOF10: NO PAIN (0)
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
## 2025-07-22 NOTE — PROGRESS NOTES
Virtual Visit Details    Type of service:  phone Visit     Originating Location (pt. Location): Home    Distant Location (provider location):  On-site  Platform used for Video Visit: Telephone    Telephone visit completed due to the patient did not have access to video, while the distant provider did.

## 2025-07-22 NOTE — NURSING NOTE
Current patient location: [address] [place] [address] [address] [place] [address]    Is the patient currently in the state of [place]? YES    Visit mode: VIDEO    If the visit is dropped, the patient can be reconnected by:VIDEO VISIT: Text to cell phone:   Telephone Information:   Mobile [phone]       Will anyone else be joining the visit? NO  (If patient encounters technical issues they should call [phone]    Are changes needed to the allergy or medication list? No    Are refills needed on medications prescribed by this physician? NO    Rooming Documentation:  Questionnaire(s) not done per department protocol    Reason for visit: Consult    Shelby Kocher VVF
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
## 2025-07-23 ENCOUNTER — MYC REFILL (OUTPATIENT)
Dept: PEDIATRICS | Facility: OTHER | Age: 75
End: 2025-07-23
Payer: COMMERCIAL

## 2025-07-23 DIAGNOSIS — I48.3 TYPICAL ATRIAL FLUTTER (H): ICD-10-CM

## 2025-07-24 ENCOUNTER — TELEPHONE (OUTPATIENT)
Dept: PEDIATRICS | Facility: OTHER | Age: 75
End: 2025-07-24
Payer: COMMERCIAL

## 2025-07-24 RX ORDER — METOPROLOL TARTRATE 50 MG
50 TABLET ORAL 2 TIMES DAILY
Qty: 180 TABLET | Refills: 4 | OUTPATIENT
Start: 2025-07-24

## 2025-07-24 NOTE — TELEPHONE ENCOUNTER
Patient sent a [employer] message requesting:      Requested Prescriptions   Pending Prescriptions Disp Refills    metoprolol tartrate (LOPRESSOR) 50 MG tablet 180 tablet 4     Sig: Take 1 tablet (50 mg) by mouth 2 times daily.       Beta-Blockers Protocol Passed - 7/24/2025  3:53 PM          Last Prescription Date:   4/15/25  Last Fill Qty/Refills:         180, R-4      Patient has refills on file. Will send patient a [employer] message.     LETICIA AMAYA RN on 7/24/2025 at 3:55 PM

## 2025-07-28 ENCOUNTER — TELEPHONE (OUTPATIENT)
Dept: PEDIATRICS | Facility: OTHER | Age: 75
End: 2025-07-28
Payer: COMMERCIAL

## 2025-07-28 NOTE — TELEPHONE ENCOUNTER
Patient states he is unfamiliar with the terminology used and orders which were placed at 7/22/25 Endocrinology visit. He also wants to know if the specialist sent a new medication into [facility] pharmacy. Advised patient to call Dr. Millan's office and ask for clarification from one of the nurses there. He states he will do so. ZOEY Orr on 7/28/2025 at 2:17 PM

## 2025-07-28 NOTE — TELEPHONE ENCOUNTER
Patient would like to speak with nurse regarding appointment pt had with endocrinology last week.    Ok to leave detailed message    Nicki Gilbert on 7/28/2025 at 10:58 AM

## 2025-07-29 NOTE — TELEPHONE ENCOUNTER
Called and spoke with patient and patient states he spoke with a nurse yesterday and he will be reaching out to Endo office.  See phone note from 7/28  Julia Gilbert RN on 7/29/2025 at 8:35 AM    
[facility]-patient returned a call       Ilda Martinez on 7/25/2025 at 12:43 PM    
LMT[facility] ext [phone]    Bianca Grace RN on 7/24/2025 at 4:07 PM     
LVMTCB x [phone].     Noel Camacho RN on 7/25/2025 at 2:13 PM    
Patient returned call.  Please call back.    Sarah Mccann on 7/25/2025 at 4:47 PM    
Patient went and saw the specialist that was recommended by Dr. Weeks and Dr. Suazo and would like help with the appt notes that are in MyChart as he says that they appear to be different than what she stated during the video visit. Please call patient to discuss.    Vandana Alvarez on 7/24/2025 at 11:55 AM    
No

## 2025-08-05 ENCOUNTER — TELEPHONE (OUTPATIENT)
Dept: ENDOCRINOLOGY | Facility: CLINIC | Age: 75
End: 2025-08-05
Payer: COMMERCIAL

## 2025-08-13 ENCOUNTER — LAB (OUTPATIENT)
Dept: LAB | Facility: OTHER | Age: 75
End: 2025-08-13
Attending: INTERNAL MEDICINE
Payer: MEDICARE

## 2025-08-13 DIAGNOSIS — E22.1 HYPERPROLACTINEMIA: ICD-10-CM

## 2025-08-13 LAB
CORTIS SERPL-MCNC: 11.5 UG/DL
FSH SERPL IRP2-ACNC: 3.2 MIU/ML (ref 1.5–12.4)
LH SERPL-ACNC: 2.3 MIU/ML (ref 1.7–8.6)
PROLACTIN SERPL 3RD IS-MCNC: 306 NG/ML (ref 4–15)

## 2025-08-13 PROCEDURE — 82024 ASSAY OF ACTH: CPT | Mod: ZL

## 2025-08-13 PROCEDURE — 84146 ASSAY OF PROLACTIN: CPT | Mod: ZL

## 2025-08-13 PROCEDURE — 82533 TOTAL CORTISOL: CPT | Mod: ZL

## 2025-08-13 PROCEDURE — 84403 ASSAY OF TOTAL TESTOSTERONE: CPT | Mod: ZL

## 2025-08-13 PROCEDURE — 36415 COLL VENOUS BLD VENIPUNCTURE: CPT | Mod: ZL

## 2025-08-13 PROCEDURE — 83002 ASSAY OF GONADOTROPIN (LH): CPT | Mod: ZL

## 2025-08-13 PROCEDURE — 83001 ASSAY OF GONADOTROPIN (FSH): CPT | Mod: ZL

## 2025-08-14 LAB — ACTH PLAS-MCNC: 26 PG/ML

## 2025-08-19 DIAGNOSIS — E78.2 MIXED HYPERLIPIDEMIA: ICD-10-CM

## 2025-08-19 DIAGNOSIS — I48.3 TYPICAL ATRIAL FLUTTER (H): ICD-10-CM

## 2025-08-19 LAB — TESTOST SERPL-MCNC: 326 NG/DL (ref 240–950)

## 2025-08-20 ENCOUNTER — TELEPHONE (OUTPATIENT)
Dept: UROLOGY | Facility: OTHER | Age: 75
End: 2025-08-20
Payer: COMMERCIAL

## 2025-08-20 DIAGNOSIS — N40.1 BENIGN LOCALIZED PROSTATIC HYPERPLASIA WITH LOWER URINARY TRACT SYMPTOMS (LUTS): ICD-10-CM

## 2025-08-20 DIAGNOSIS — E22.1 HYPERPROLACTINEMIA: Primary | ICD-10-CM

## 2025-08-20 RX ORDER — TAMSULOSIN HYDROCHLORIDE 0.4 MG/1
0.4 CAPSULE ORAL DAILY
Qty: 90 CAPSULE | Refills: 3 | Status: SHIPPED | OUTPATIENT
Start: 2025-08-20

## 2025-08-20 RX ORDER — TAMSULOSIN HYDROCHLORIDE 0.4 MG/1
0.4 CAPSULE ORAL DAILY
Qty: 30 CAPSULE | Refills: 11 | Status: CANCELLED | OUTPATIENT
Start: 2025-08-20

## 2025-08-20 RX ORDER — CABERGOLINE 0.5 MG/1
0.25 TABLET ORAL
Qty: 15 TABLET | Refills: 3 | Status: SHIPPED | OUTPATIENT
Start: 2025-08-21

## 2025-08-21 RX ORDER — ATORVASTATIN CALCIUM 10 MG/1
10 TABLET, FILM COATED ORAL DAILY
Qty: 90 TABLET | Refills: 2 | Status: SHIPPED | OUTPATIENT
Start: 2025-08-21

## 2025-08-21 RX ORDER — METOPROLOL TARTRATE 50 MG
50 TABLET ORAL 2 TIMES DAILY
Qty: 180 TABLET | Refills: 1 | Status: SHIPPED | OUTPATIENT
Start: 2025-08-21